# Patient Record
Sex: MALE | Race: WHITE | NOT HISPANIC OR LATINO | Employment: FULL TIME | URBAN - NONMETROPOLITAN AREA
[De-identification: names, ages, dates, MRNs, and addresses within clinical notes are randomized per-mention and may not be internally consistent; named-entity substitution may affect disease eponyms.]

---

## 2020-11-13 ENCOUNTER — HOSPITAL ENCOUNTER (EMERGENCY)
Facility: HOSPITAL | Age: 35
Discharge: HOME | End: 2020-11-13
Attending: EMERGENCY MEDICINE
Payer: MEDICAID

## 2020-11-13 VITALS
TEMPERATURE: 98 F | OXYGEN SATURATION: 98 % | SYSTOLIC BLOOD PRESSURE: 127 MMHG | HEIGHT: 70 IN | HEART RATE: 86 BPM | RESPIRATION RATE: 16 BRPM | DIASTOLIC BLOOD PRESSURE: 56 MMHG | BODY MASS INDEX: 23.19 KG/M2 | WEIGHT: 162 LBS

## 2020-11-13 DIAGNOSIS — L04.0 ACUTE CERVICAL ADENITIS: Primary | ICD-10-CM

## 2020-11-13 PROCEDURE — 99284 EMERGENCY DEPT VISIT MOD MDM: CPT | Mod: 25

## 2020-11-13 PROCEDURE — 63600175 PHARM REV CODE 636 W HCPCS: Performed by: EMERGENCY MEDICINE

## 2020-11-13 PROCEDURE — 25000003 PHARM REV CODE 250: Performed by: EMERGENCY MEDICINE

## 2020-11-13 PROCEDURE — 96372 THER/PROPH/DIAG INJ SC/IM: CPT

## 2020-11-13 RX ORDER — IBUPROFEN 600 MG/1
600 TABLET ORAL
Status: COMPLETED | OUTPATIENT
Start: 2020-11-13 | End: 2020-11-13

## 2020-11-13 RX ADMIN — IBUPROFEN 600 MG: 600 TABLET, FILM COATED ORAL at 09:11

## 2020-11-13 RX ADMIN — PENICILLIN G BENZATHINE AND PENICILLIN G PROCAINE 1.2 MILLION UNITS: 600000; 600000 INJECTION, SUSPENSION INTRAMUSCULAR at 09:11

## 2020-11-13 NOTE — ED PROVIDER NOTES
Encounter Date: 11/13/2020       History     Chief Complaint   Patient presents with    Sore Throat     pain to the between left jaw and neck since this morning.  palpable knot noted.  tender upon palpation.     35-year-old male presents with swollen lymph nodes to his neck.  Symptoms started this morning.  He states that is swollen and tender with the left side being worse than the right side.  Patient denies congestion, cough, sore throat, ear pain, rash, or dental pain.  Patient denies any other symptoms        Review of patient's allergies indicates:  No Known Allergies  History reviewed. No pertinent past medical history.  History reviewed. No pertinent surgical history.  History reviewed. No pertinent family history.  Social History     Tobacco Use    Smoking status: Smoker, Current Status Unknown     Packs/day: 1.00    Smokeless tobacco: Never Used   Substance Use Topics    Alcohol use: Not Currently    Drug use: Not Currently     Review of Systems   HENT:        Cervical adenopathy   All other systems reviewed and are negative.      Physical Exam     Initial Vitals [11/13/20 0848]   BP Pulse Resp Temp SpO2   (!) 127/58 86 16 98 °F (36.7 °C) 98 %      MAP       --         Physical Exam    Nursing note and vitals reviewed.  Constitutional: He appears well-developed and well-nourished.   HENT:   Mouth/Throat: Oropharynx is clear and moist.   Neck: Normal range of motion. Neck supple. No tracheal deviation present.   Mild cervical adenopathy left greater than right, mild tenderness to palpation of anterior cervical chain, no erythema   Cardiovascular: Normal rate and regular rhythm.   Pulmonary/Chest: Breath sounds normal. No stridor. No respiratory distress.   Lymphadenopathy:     He has cervical adenopathy.   Neurological: He is alert and oriented to person, place, and time.   Skin: Skin is warm and dry.         ED Course   Procedures  Labs Reviewed - No data to display       Imaging Results    None                                       Clinical Impression:     ICD-10-CM ICD-9-CM   1. Acute cervical adenitis  L04.0 683                                               Hollis Kessler MD  11/13/20 0904

## 2020-11-14 ENCOUNTER — HOSPITAL ENCOUNTER (EMERGENCY)
Facility: HOSPITAL | Age: 35
Discharge: HOME | End: 2020-11-14
Attending: EMERGENCY MEDICINE
Payer: MEDICAID

## 2020-11-14 VITALS
TEMPERATURE: 98 F | HEIGHT: 70 IN | WEIGHT: 161.38 LBS | SYSTOLIC BLOOD PRESSURE: 137 MMHG | BODY MASS INDEX: 23.1 KG/M2 | OXYGEN SATURATION: 98 % | HEART RATE: 106 BPM | DIASTOLIC BLOOD PRESSURE: 82 MMHG | RESPIRATION RATE: 16 BRPM

## 2020-11-14 DIAGNOSIS — R59.0 LYMPHADENOPATHY OF HEAD AND NECK REGION: ICD-10-CM

## 2020-11-14 DIAGNOSIS — J34.0 CELLULITIS OF NOSE, EXTERNAL: Primary | ICD-10-CM

## 2020-11-14 PROCEDURE — 99284 EMERGENCY DEPT VISIT MOD MDM: CPT

## 2020-11-14 PROCEDURE — 25000003 PHARM REV CODE 250: Performed by: EMERGENCY MEDICINE

## 2020-11-14 RX ORDER — SULFAMETHOXAZOLE AND TRIMETHOPRIM 800; 160 MG/1; MG/1
1 TABLET ORAL 2 TIMES DAILY
Qty: 20 TABLET | Refills: 0 | Status: SHIPPED | OUTPATIENT
Start: 2020-11-14 | End: 2020-11-24

## 2020-11-14 RX ORDER — TRAMADOL HYDROCHLORIDE 50 MG/1
50 TABLET ORAL EVERY 6 HOURS PRN
Qty: 8 TABLET | Refills: 0 | Status: SHIPPED | OUTPATIENT
Start: 2020-11-14

## 2020-11-14 RX ORDER — IBUPROFEN 600 MG/1
600 TABLET ORAL EVERY 6 HOURS PRN
Qty: 40 TABLET | Refills: 0 | Status: SHIPPED | OUTPATIENT
Start: 2020-11-14 | End: 2021-07-27 | Stop reason: SDUPTHER

## 2020-11-14 RX ORDER — IBUPROFEN 600 MG/1
600 TABLET ORAL
Status: COMPLETED | OUTPATIENT
Start: 2020-11-14 | End: 2020-11-14

## 2020-11-14 RX ORDER — SULFAMETHOXAZOLE AND TRIMETHOPRIM 800; 160 MG/1; MG/1
1 TABLET ORAL
Status: COMPLETED | OUTPATIENT
Start: 2020-11-14 | End: 2020-11-14

## 2020-11-14 RX ADMIN — SULFAMETHOXAZOLE AND TRIMETHOPRIM 1 TABLET: 800; 160 TABLET ORAL at 04:11

## 2020-11-14 RX ADMIN — IBUPROFEN 600 MG: 600 TABLET, FILM COATED ORAL at 04:11

## 2020-11-14 NOTE — ED PROVIDER NOTES
"Encounter Date: 11/14/2020       History     Chief Complaint   Patient presents with    Lymphadenopathy     Patient seen in ER for swelling to neck, was treated and sent home. Today, patient reports continued swelling and pain to lymph nodes to right side of neck and "bite" and redness to tip of nose.      35-year-old male presents with tenderness, redness, and swelling of the tip of his nose.  He also complains of swollen lymph nodes in his neck.  The patient was seen yesterday for cervical adenitis received a Bicillin shot. He states the swelling is nose got much worse since then.  He still denies sore throat or fever        Review of patient's allergies indicates:  No Known Allergies  No past medical history on file.  No past surgical history on file.  No family history on file.  Social History     Tobacco Use    Smoking status: Smoker, Current Status Unknown     Packs/day: 1.00    Smokeless tobacco: Never Used   Substance Use Topics    Alcohol use: Not Currently    Drug use: Not Currently     Review of Systems   HENT:        Adenopathy   Skin:        Nasal cellulitis   All other systems reviewed and are negative.      Physical Exam     Initial Vitals [11/14/20 1636]   BP Pulse Resp Temp SpO2   137/82 106 16 98.3 °F (36.8 °C) 98 %      MAP       --         Physical Exam    Nursing note and vitals reviewed.  Constitutional: He appears well-developed and well-nourished.   HENT:   Mouth/Throat: Oropharynx is clear and moist.   Neck: Neck supple.   Cardiovascular: Normal rate and regular rhythm.   Pulmonary/Chest: Breath sounds normal. No respiratory distress.   Lymphadenopathy:     He has cervical adenopathy.   Neurological: He is alert and oriented to person, place, and time.   Skin: Skin is warm and dry.   Moderate cellulitis to tip of nose, no drainage, no fluctuance noted         ED Course   Procedures  Labs Reviewed - No data to display       Imaging Results    None                                  "     Clinical Impression:     ICD-10-CM ICD-9-CM   1. Cellulitis of nose, external  J34.0 682.0   2. Lymphadenopathy of head and neck region  R59.0 785.6                          ED Disposition Condition    Discharge Stable        ED Prescriptions     Medication Sig Dispense Start Date End Date Auth. Provider    sulfamethoxazole-trimethoprim 800-160mg (BACTRIM DS) 800-160 mg Tab Take 1 tablet by mouth 2 (two) times daily. for 10 days 20 tablet 11/14/2020 11/24/2020 Hollis Kessler MD    ibuprofen (ADVIL,MOTRIN) 600 MG tablet Take 1 tablet (600 mg total) by mouth every 6 (six) hours as needed for Pain. 40 tablet 11/14/2020  Hollis Kessler MD    traMADoL (ULTRAM) 50 mg tablet Take 1 tablet (50 mg total) by mouth every 6 (six) hours as needed for Pain. 8 tablet 11/14/2020  Hollis Kessler MD        Follow-up Information     Follow up With Specialties Details Why Contact Info    Primary care physician  In 3 days As needed, If symptoms worsen                                        Hollis Kessler MD  11/14/20 7679

## 2021-07-21 ENCOUNTER — HOSPITAL ENCOUNTER (EMERGENCY)
Facility: HOSPITAL | Age: 36
Discharge: HOME | End: 2021-07-21
Attending: EMERGENCY MEDICINE
Payer: MEDICAID

## 2021-07-21 VITALS
SYSTOLIC BLOOD PRESSURE: 141 MMHG | HEART RATE: 118 BPM | DIASTOLIC BLOOD PRESSURE: 57 MMHG | OXYGEN SATURATION: 96 % | RESPIRATION RATE: 17 BRPM | TEMPERATURE: 99 F

## 2021-07-21 DIAGNOSIS — Z20.822 LAB TEST NEGATIVE FOR COVID-19 VIRUS: Primary | ICD-10-CM

## 2021-07-21 LAB
CTP QC/QA: YES
SARS-COV-2 RDRP RESP QL NAA+PROBE: NEGATIVE

## 2021-07-21 PROCEDURE — 99282 EMERGENCY DEPT VISIT SF MDM: CPT

## 2021-07-21 PROCEDURE — U0002 COVID-19 LAB TEST NON-CDC: HCPCS | Performed by: EMERGENCY MEDICINE

## 2021-07-23 ENCOUNTER — HOSPITAL ENCOUNTER (EMERGENCY)
Facility: HOSPITAL | Age: 36
Discharge: HOME | End: 2021-07-23
Attending: EMERGENCY MEDICINE
Payer: MEDICAID

## 2021-07-23 VITALS
HEART RATE: 99 BPM | HEIGHT: 70 IN | WEIGHT: 150.19 LBS | SYSTOLIC BLOOD PRESSURE: 113 MMHG | TEMPERATURE: 99 F | RESPIRATION RATE: 18 BRPM | BODY MASS INDEX: 21.5 KG/M2 | OXYGEN SATURATION: 95 % | DIASTOLIC BLOOD PRESSURE: 66 MMHG

## 2021-07-23 DIAGNOSIS — J06.9 VIRAL URI WITH COUGH: Primary | ICD-10-CM

## 2021-07-23 LAB
CTP QC/QA: YES
SARS-COV-2 RDRP RESP QL NAA+PROBE: NEGATIVE

## 2021-07-23 PROCEDURE — 63600175 PHARM REV CODE 636 W HCPCS: Performed by: CLINICAL NURSE SPECIALIST

## 2021-07-23 PROCEDURE — 99284 EMERGENCY DEPT VISIT MOD MDM: CPT

## 2021-07-23 PROCEDURE — 96372 THER/PROPH/DIAG INJ SC/IM: CPT

## 2021-07-23 PROCEDURE — U0002 COVID-19 LAB TEST NON-CDC: HCPCS | Performed by: CLINICAL NURSE SPECIALIST

## 2021-07-23 RX ORDER — DEXAMETHASONE SODIUM PHOSPHATE 4 MG/ML
4 INJECTION, SOLUTION INTRA-ARTICULAR; INTRALESIONAL; INTRAMUSCULAR; INTRAVENOUS; SOFT TISSUE
Status: COMPLETED | OUTPATIENT
Start: 2021-07-23 | End: 2021-07-23

## 2021-07-23 RX ORDER — BENZONATATE 100 MG/1
100 CAPSULE ORAL 3 TIMES DAILY PRN
Qty: 20 CAPSULE | Refills: 0 | Status: SHIPPED | OUTPATIENT
Start: 2021-07-23 | End: 2021-07-27 | Stop reason: SDUPTHER

## 2021-07-23 RX ADMIN — DEXAMETHASONE SODIUM PHOSPHATE 4 MG: 4 INJECTION, SOLUTION INTRA-ARTICULAR; INTRALESIONAL; INTRAMUSCULAR; INTRAVENOUS; SOFT TISSUE at 11:07

## 2021-07-27 ENCOUNTER — HOSPITAL ENCOUNTER (EMERGENCY)
Facility: HOSPITAL | Age: 36
Discharge: HOME | End: 2021-07-27
Attending: EMERGENCY MEDICINE
Payer: MEDICAID

## 2021-07-27 VITALS
TEMPERATURE: 99 F | DIASTOLIC BLOOD PRESSURE: 64 MMHG | BODY MASS INDEX: 21.62 KG/M2 | SYSTOLIC BLOOD PRESSURE: 99 MMHG | HEIGHT: 70 IN | HEART RATE: 113 BPM | RESPIRATION RATE: 16 BRPM | OXYGEN SATURATION: 95 % | WEIGHT: 151 LBS

## 2021-07-27 DIAGNOSIS — J06.9 UPPER RESPIRATORY TRACT INFECTION, UNSPECIFIED TYPE: Primary | ICD-10-CM

## 2021-07-27 LAB
CTP QC/QA: YES
SARS-COV-2 RDRP RESP QL NAA+PROBE: NEGATIVE

## 2021-07-27 PROCEDURE — 63600175 PHARM REV CODE 636 W HCPCS: Performed by: CLINICAL NURSE SPECIALIST

## 2021-07-27 PROCEDURE — U0002 COVID-19 LAB TEST NON-CDC: HCPCS | Performed by: CLINICAL NURSE SPECIALIST

## 2021-07-27 PROCEDURE — 96372 THER/PROPH/DIAG INJ SC/IM: CPT

## 2021-07-27 PROCEDURE — 99284 EMERGENCY DEPT VISIT MOD MDM: CPT | Mod: 25

## 2021-07-27 PROCEDURE — 0001A HC IMMUNIZ ADMIN, SARS-COV-2 COVID-19 VACC, 30MCG/0.3ML, 1ST DOSE: CPT | Performed by: CLINICAL NURSE SPECIALIST

## 2021-07-27 PROCEDURE — 91300 PHARM REV CODE 636 W HCPCS: CPT | Performed by: CLINICAL NURSE SPECIALIST

## 2021-07-27 RX ORDER — BENZONATATE 100 MG/1
100 CAPSULE ORAL 3 TIMES DAILY PRN
Qty: 20 CAPSULE | Refills: 0 | Status: SHIPPED | OUTPATIENT
Start: 2021-07-27 | End: 2021-08-06

## 2021-07-27 RX ORDER — IBUPROFEN 600 MG/1
600 TABLET ORAL EVERY 6 HOURS PRN
Qty: 40 TABLET | Refills: 0 | Status: SHIPPED | OUTPATIENT
Start: 2021-07-27

## 2021-07-27 RX ORDER — DEXAMETHASONE SODIUM PHOSPHATE 4 MG/ML
4 INJECTION, SOLUTION INTRA-ARTICULAR; INTRALESIONAL; INTRAMUSCULAR; INTRAVENOUS; SOFT TISSUE
Status: COMPLETED | OUTPATIENT
Start: 2021-07-27 | End: 2021-07-27

## 2021-07-27 RX ADMIN — RNA INGREDIENT BNT-162B2 0.3 ML: 0.23 INJECTION, SUSPENSION INTRAMUSCULAR at 04:07

## 2021-07-27 RX ADMIN — DEXAMETHASONE SODIUM PHOSPHATE 4 MG: 4 INJECTION, SOLUTION INTRA-ARTICULAR; INTRALESIONAL; INTRAMUSCULAR; INTRAVENOUS; SOFT TISSUE at 04:07

## 2021-08-02 ENCOUNTER — HOSPITAL ENCOUNTER (EMERGENCY)
Facility: HOSPITAL | Age: 36
Discharge: HOME | End: 2021-08-02
Attending: EMERGENCY MEDICINE
Payer: MEDICAID

## 2021-08-02 VITALS
HEART RATE: 88 BPM | TEMPERATURE: 98 F | WEIGHT: 147.69 LBS | SYSTOLIC BLOOD PRESSURE: 135 MMHG | BODY MASS INDEX: 21.19 KG/M2 | DIASTOLIC BLOOD PRESSURE: 82 MMHG | RESPIRATION RATE: 20 BRPM | OXYGEN SATURATION: 98 %

## 2021-08-02 DIAGNOSIS — J18.9 PNEUMONIA OF LEFT LOWER LOBE DUE TO INFECTIOUS ORGANISM: Primary | ICD-10-CM

## 2021-08-02 DIAGNOSIS — R05.9 COUGH: ICD-10-CM

## 2021-08-02 PROCEDURE — 96372 THER/PROPH/DIAG INJ SC/IM: CPT

## 2021-08-02 PROCEDURE — 63600175 PHARM REV CODE 636 W HCPCS: Performed by: EMERGENCY MEDICINE

## 2021-08-02 PROCEDURE — 25000003 PHARM REV CODE 250: Performed by: EMERGENCY MEDICINE

## 2021-08-02 PROCEDURE — 99284 EMERGENCY DEPT VISIT MOD MDM: CPT | Mod: 25

## 2021-08-02 PROCEDURE — 25000242 PHARM REV CODE 250 ALT 637 W/ HCPCS: Performed by: EMERGENCY MEDICINE

## 2021-08-02 RX ORDER — LEVOFLOXACIN 500 MG/1
500 TABLET, FILM COATED ORAL ONCE
Status: COMPLETED | OUTPATIENT
Start: 2021-08-02 | End: 2021-08-02

## 2021-08-02 RX ORDER — IPRATROPIUM BROMIDE AND ALBUTEROL SULFATE 2.5; .5 MG/3ML; MG/3ML
3 SOLUTION RESPIRATORY (INHALATION)
Status: COMPLETED | OUTPATIENT
Start: 2021-08-02 | End: 2021-08-02

## 2021-08-02 RX ORDER — DEXAMETHASONE SODIUM PHOSPHATE 4 MG/ML
8 INJECTION, SOLUTION INTRA-ARTICULAR; INTRALESIONAL; INTRAMUSCULAR; INTRAVENOUS; SOFT TISSUE
Status: COMPLETED | OUTPATIENT
Start: 2021-08-02 | End: 2021-08-02

## 2021-08-02 RX ORDER — LEVOFLOXACIN 500 MG/1
500 TABLET, FILM COATED ORAL DAILY
Status: DISCONTINUED | OUTPATIENT
Start: 2021-08-02 | End: 2021-08-02

## 2021-08-02 RX ORDER — CEFTRIAXONE 1 G/1
1 INJECTION, POWDER, FOR SOLUTION INTRAMUSCULAR; INTRAVENOUS
Status: COMPLETED | OUTPATIENT
Start: 2021-08-02 | End: 2021-08-02

## 2021-08-02 RX ORDER — ALBUTEROL SULFATE 90 UG/1
2 AEROSOL, METERED RESPIRATORY (INHALATION) EVERY 6 HOURS PRN
Qty: 18 G | Refills: 0 | Status: SHIPPED | OUTPATIENT
Start: 2021-08-02 | End: 2021-08-25 | Stop reason: SDUPTHER

## 2021-08-02 RX ORDER — PROMETHAZINE HYDROCHLORIDE AND DEXTROMETHORPHAN HYDROBROMIDE 6.25; 15 MG/5ML; MG/5ML
5 SYRUP ORAL EVERY 4 HOURS PRN
Qty: 120 ML | Refills: 0 | Status: SHIPPED | OUTPATIENT
Start: 2021-08-02 | End: 2021-08-12

## 2021-08-02 RX ORDER — LEVOFLOXACIN 500 MG/1
500 TABLET, FILM COATED ORAL DAILY
Qty: 10 TABLET | Refills: 0 | Status: SHIPPED | OUTPATIENT
Start: 2021-08-02 | End: 2021-08-12

## 2021-08-02 RX ORDER — DEXAMETHASONE 4 MG/1
8 TABLET ORAL DAILY
Qty: 14 TABLET | Refills: 0 | Status: SHIPPED | OUTPATIENT
Start: 2021-08-02 | End: 2021-08-09

## 2021-08-02 RX ADMIN — IPRATROPIUM BROMIDE AND ALBUTEROL SULFATE 3 ML: 2.5; .5 SOLUTION RESPIRATORY (INHALATION) at 06:08

## 2021-08-02 RX ADMIN — LEVOFLOXACIN 500 MG: 500 TABLET, FILM COATED ORAL at 06:08

## 2021-08-02 RX ADMIN — CEFTRIAXONE SODIUM 1 G: 1 INJECTION, POWDER, FOR SOLUTION INTRAMUSCULAR; INTRAVENOUS at 06:08

## 2021-08-02 RX ADMIN — DEXAMETHASONE SODIUM PHOSPHATE 8 MG: 4 INJECTION, SOLUTION INTRA-ARTICULAR; INTRALESIONAL; INTRAMUSCULAR; INTRAVENOUS; SOFT TISSUE at 06:08

## 2021-08-25 ENCOUNTER — HOSPITAL ENCOUNTER (EMERGENCY)
Facility: HOSPITAL | Age: 36
Discharge: HOME | End: 2021-08-25
Attending: EMERGENCY MEDICINE
Payer: MEDICAID

## 2021-08-25 VITALS
BODY MASS INDEX: 21.52 KG/M2 | DIASTOLIC BLOOD PRESSURE: 61 MMHG | SYSTOLIC BLOOD PRESSURE: 118 MMHG | WEIGHT: 150 LBS | TEMPERATURE: 98 F | HEART RATE: 106 BPM | OXYGEN SATURATION: 96 % | RESPIRATION RATE: 16 BRPM

## 2021-08-25 DIAGNOSIS — U07.1 COVID-19: Primary | ICD-10-CM

## 2021-08-25 LAB
CTP QC/QA: YES
SARS-COV-2 RDRP RESP QL NAA+PROBE: POSITIVE

## 2021-08-25 PROCEDURE — U0002 COVID-19 LAB TEST NON-CDC: HCPCS | Performed by: CLINICAL NURSE SPECIALIST

## 2021-08-25 PROCEDURE — 99284 EMERGENCY DEPT VISIT MOD MDM: CPT | Mod: 25

## 2021-08-25 RX ORDER — BENZONATATE 100 MG/1
100 CAPSULE ORAL 3 TIMES DAILY PRN
Qty: 20 CAPSULE | Refills: 0 | Status: SHIPPED | OUTPATIENT
Start: 2021-08-25 | End: 2021-09-04

## 2021-08-25 RX ORDER — ONDANSETRON 4 MG/1
4 TABLET, FILM COATED ORAL EVERY 6 HOURS
Qty: 12 TABLET | Refills: 0 | Status: SHIPPED | OUTPATIENT
Start: 2021-08-25

## 2021-08-25 RX ORDER — ALBUTEROL SULFATE 90 UG/1
2 AEROSOL, METERED RESPIRATORY (INHALATION) EVERY 6 HOURS PRN
Qty: 18 G | Refills: 0 | Status: ON HOLD | OUTPATIENT
Start: 2021-08-25 | End: 2022-11-12 | Stop reason: HOSPADM

## 2021-08-25 RX ORDER — AZITHROMYCIN 250 MG/1
250 TABLET, FILM COATED ORAL DAILY
Qty: 6 TABLET | Refills: 0 | Status: ON HOLD | OUTPATIENT
Start: 2021-08-25 | End: 2022-11-12 | Stop reason: HOSPADM

## 2022-11-06 ENCOUNTER — HOSPITAL ENCOUNTER (EMERGENCY)
Facility: HOSPITAL | Age: 37
Discharge: HOME | End: 2022-11-06
Attending: STUDENT IN AN ORGANIZED HEALTH CARE EDUCATION/TRAINING PROGRAM
Payer: MEDICAID

## 2022-11-06 VITALS
TEMPERATURE: 98 F | OXYGEN SATURATION: 100 % | SYSTOLIC BLOOD PRESSURE: 115 MMHG | RESPIRATION RATE: 16 BRPM | BODY MASS INDEX: 21.95 KG/M2 | HEART RATE: 124 BPM | DIASTOLIC BLOOD PRESSURE: 50 MMHG | WEIGHT: 153 LBS

## 2022-11-06 DIAGNOSIS — J10.1 INFLUENZA A: Primary | ICD-10-CM

## 2022-11-06 LAB
CTP QC/QA: YES
POC MOLECULAR INFLUENZA A AGN: POSITIVE
POC MOLECULAR INFLUENZA B AGN: NEGATIVE

## 2022-11-06 PROCEDURE — 99284 EMERGENCY DEPT VISIT MOD MDM: CPT

## 2022-11-06 PROCEDURE — 87502 INFLUENZA DNA AMP PROBE: CPT

## 2022-11-06 RX ORDER — OSELTAMIVIR PHOSPHATE 75 MG/1
75 CAPSULE ORAL 2 TIMES DAILY
Qty: 10 CAPSULE | Refills: 0 | Status: ON HOLD | OUTPATIENT
Start: 2022-11-06 | End: 2022-11-12 | Stop reason: HOSPADM

## 2022-11-06 RX ORDER — PROMETHAZINE HYDROCHLORIDE AND DEXTROMETHORPHAN HYDROBROMIDE 6.25; 15 MG/5ML; MG/5ML
5 SYRUP ORAL EVERY 4 HOURS PRN
Qty: 118 ML | Refills: 0 | Status: SHIPPED | OUTPATIENT
Start: 2022-11-06 | End: 2022-11-16

## 2022-11-06 RX ORDER — FLUTICASONE PROPIONATE 50 MCG
1 SPRAY, SUSPENSION (ML) NASAL 2 TIMES DAILY PRN
Qty: 15 G | Refills: 0 | Status: SHIPPED | OUTPATIENT
Start: 2022-11-06

## 2022-11-06 NOTE — Clinical Note
"Kj"Yulia Denney was seen and treated in our emergency department on 11/6/2022.  He may return to work on 11/09/2022.       If you have any questions or concerns, please don't hesitate to call.      Nia Venegas NP"

## 2022-11-07 NOTE — ED PROVIDER NOTES
Encounter Date: 11/6/2022       History     Chief Complaint   Patient presents with    Generalized Body Aches     Pt stated that he began experiencing body aches / cough / fever since earlier today. Spouse also ill.      This is a 37-year-old white male with noncontributory past medical history, smoker, who presents to the emergency department with concerns regarding URI signs and symptoms after known exposure to significant other with similar symptoms.  Today patient developed stuffy/runny nose, sore throat, cough, body aches, fatigue, and headache.  He denies vomiting or diarrhea.    Review of patient's allergies indicates:  No Known Allergies  History reviewed. No pertinent past medical history.  No past surgical history on file.  No family history on file.  Social History     Tobacco Use    Smoking status: Smoker, Current Status Unknown     Packs/day: 1.00     Types: Cigarettes    Smokeless tobacco: Never   Substance Use Topics    Alcohol use: Not Currently    Drug use: Not Currently     Review of Systems   Constitutional:  Positive for fatigue and fever.   HENT:  Positive for congestion, rhinorrhea and sore throat.    Respiratory:  Positive for cough.    Gastrointestinal: Negative.    Musculoskeletal:  Positive for myalgias.   Neurological:  Positive for headaches.     Physical Exam     Initial Vitals   BP Pulse Resp Temp SpO2   11/06/22 2029 11/06/22 2027 11/06/22 2027 11/06/22 2027 11/06/22 2027   (!) 115/50 (!) 124 16 98.4 °F (36.9 °C) 100 %      MAP       --                Physical Exam    Nursing note and vitals reviewed.  Constitutional: He appears well-developed and well-nourished. He is active. No distress.   HENT:   Head: Normocephalic and atraumatic.   Mouth/Throat: No oropharyngeal exudate.   Erythema noted to posterior pharynx   Eyes: EOM are normal. Pupils are equal, round, and reactive to light.   Neck: Neck supple.   Normal range of motion.  Cardiovascular:  Normal rate, regular rhythm and normal  heart sounds.           Pulmonary/Chest: Breath sounds normal. No respiratory distress.   Musculoskeletal:         General: Normal range of motion.      Cervical back: Normal range of motion and neck supple.     Neurological: He is alert and oriented to person, place, and time. GCS score is 15. GCS eye subscore is 4. GCS verbal subscore is 5. GCS motor subscore is 6.   Skin: Skin is warm and dry. Capillary refill takes less than 2 seconds.   Psychiatric: He has a normal mood and affect. His behavior is normal. Thought content normal.       ED Course   Procedures  Labs Reviewed   POCT INFLUENZA A/B MOLECULAR - Abnormal; Notable for the following components:       Result Value    POC Molecular Influenza A Ag Positive (*)     All other components within normal limits          Imaging Results    None          Medications - No data to display              ED Course as of 11/06/22 2047   Sun Nov 06, 2022 2046 Influenza A positive, B negative [CB]      ED Course User Index  [CB] Nia Venegas NP                 Clinical Impression:   Final diagnoses:  [J10.1] Influenza A (Primary)      ED Disposition Condition    Discharge Stable          ED Prescriptions       Medication Sig Dispense Start Date End Date Auth. Provider    promethazine-dextromethorphan (PROMETHAZINE-DM) 6.25-15 mg/5 mL Syrp Take 5 mLs by mouth every 4 (four) hours as needed. 118 mL 11/6/2022 11/16/2022 Nia Venegas NP    fluticasone propionate (FLONASE) 50 mcg/actuation nasal spray 1 spray (50 mcg total) by Each Nostril route 2 (two) times daily as needed. 15 g 11/6/2022 -- Nia Venegas NP    oseltamivir (TAMIFLU) 75 MG capsule Take 1 capsule (75 mg total) by mouth 2 (two) times daily. for 5 days 10 capsule 11/6/2022 11/11/2022 iNa Venegas NP          Follow-up Information       Follow up With Specialties Details Why Contact Info    PCP Follow UP  Schedule an appointment as soon as possible for a visit in 2 days for follow-up, for  re-evaluation of today's complaint              Nia Venegas, ARIANNA  11/06/22 2048

## 2022-11-10 ENCOUNTER — HOSPITAL ENCOUNTER (INPATIENT)
Facility: HOSPITAL | Age: 37
LOS: 2 days | Discharge: LEFT BEFORE DISCHARGE | DRG: 871 | End: 2022-11-12
Attending: STUDENT IN AN ORGANIZED HEALTH CARE EDUCATION/TRAINING PROGRAM | Admitting: EMERGENCY MEDICINE
Payer: MEDICAID

## 2022-11-10 DIAGNOSIS — J10.1 INFLUENZA A: ICD-10-CM

## 2022-11-10 DIAGNOSIS — R06.89 RESPIRATORY INSUFFICIENCY: ICD-10-CM

## 2022-11-10 DIAGNOSIS — R09.02 HYPOXIC: ICD-10-CM

## 2022-11-10 DIAGNOSIS — J18.9 PNEUMONIA DUE TO INFECTIOUS ORGANISM, UNSPECIFIED LATERALITY, UNSPECIFIED PART OF LUNG: Primary | ICD-10-CM

## 2022-11-10 DIAGNOSIS — A41.9 SEPSIS, DUE TO UNSPECIFIED ORGANISM, UNSPECIFIED WHETHER ACUTE ORGAN DYSFUNCTION PRESENT: ICD-10-CM

## 2022-11-10 DIAGNOSIS — A41.9 SEPSIS: ICD-10-CM

## 2022-11-10 LAB
ALBUMIN SERPL BCP-MCNC: 2.8 G/DL (ref 3.5–5.2)
ALP SERPL-CCNC: 70 U/L (ref 55–135)
ALT SERPL W/O P-5'-P-CCNC: 27 U/L (ref 10–44)
ANION GAP SERPL CALC-SCNC: 9 MMOL/L (ref 8–16)
APTT BLDCRRT: 29.8 SEC (ref 21–32)
AST SERPL-CCNC: 44 U/L (ref 10–40)
BASOPHILS # BLD AUTO: ABNORMAL K/UL (ref 0–0.2)
BASOPHILS NFR BLD: 0 % (ref 0–1.9)
BILIRUB SERPL-MCNC: 0.8 MG/DL (ref 0.1–1)
BUN SERPL-MCNC: 25 MG/DL (ref 6–20)
CALCIUM SERPL-MCNC: 8.5 MG/DL (ref 8.7–10.5)
CHLORIDE SERPL-SCNC: 96 MMOL/L (ref 95–110)
CO2 SERPL-SCNC: 26 MMOL/L (ref 23–29)
CREAT SERPL-MCNC: 0.9 MG/DL (ref 0.5–1.4)
CTP QC/QA: YES
DIFFERENTIAL METHOD: ABNORMAL
EOSINOPHIL # BLD AUTO: ABNORMAL K/UL (ref 0–0.5)
EOSINOPHIL NFR BLD: 0 % (ref 0–8)
ERYTHROCYTE [DISTWIDTH] IN BLOOD BY AUTOMATED COUNT: 12.2 % (ref 11.5–14.5)
EST. GFR  (NO RACE VARIABLE): >60 ML/MIN/1.73 M^2
GLUCOSE SERPL-MCNC: 159 MG/DL (ref 70–110)
HCT VFR BLD AUTO: 41.7 % (ref 40–54)
HGB BLD-MCNC: 14.8 G/DL (ref 14–18)
IMM GRANULOCYTES # BLD AUTO: ABNORMAL K/UL (ref 0–0.04)
IMM GRANULOCYTES NFR BLD AUTO: ABNORMAL % (ref 0–0.5)
INR PPP: 0.9 (ref 0.8–1.2)
LACTATE SERPL-SCNC: 2.4 MMOL/L (ref 0.5–2.2)
LACTATE SERPL-SCNC: 2.5 MMOL/L (ref 0.5–2.2)
LYMPHOCYTES # BLD AUTO: ABNORMAL K/UL (ref 1–4.8)
LYMPHOCYTES NFR BLD: 9 % (ref 18–48)
MCH RBC QN AUTO: 29.5 PG (ref 27–31)
MCHC RBC AUTO-ENTMCNC: 35.5 G/DL (ref 32–36)
MCV RBC AUTO: 83 FL (ref 82–98)
METAMYELOCYTES NFR BLD MANUAL: 2 %
MONOCYTES # BLD AUTO: ABNORMAL K/UL (ref 0.3–1)
MONOCYTES NFR BLD: 2 % (ref 4–15)
MYELOCYTES NFR BLD MANUAL: 2 %
NEUTROPHILS NFR BLD: 53 % (ref 38–73)
NEUTS BAND NFR BLD MANUAL: 32 %
NRBC BLD-RTO: 0 /100 WBC
NT-PROBNP SERPL-MCNC: 75 PG/ML (ref 5–450)
PLATELET # BLD AUTO: 177 K/UL (ref 150–450)
PMV BLD AUTO: 9.8 FL (ref 9.2–12.9)
POTASSIUM SERPL-SCNC: 3.6 MMOL/L (ref 3.5–5.1)
PROT SERPL-MCNC: 7.2 G/DL (ref 6–8.4)
PROTHROMBIN TIME: 10.2 SEC (ref 9–12.5)
RBC # BLD AUTO: 5.01 M/UL (ref 4.6–6.2)
SARS-COV-2 RDRP RESP QL NAA+PROBE: NEGATIVE
SODIUM SERPL-SCNC: 131 MMOL/L (ref 136–145)
TROPONIN I SERPL DL<=0.01 NG/ML-MCNC: 5.4 PG/ML (ref 0–60)
WBC # BLD AUTO: 6.19 K/UL (ref 3.9–12.7)

## 2022-11-10 PROCEDURE — 83605 ASSAY OF LACTIC ACID: CPT | Mod: 91 | Performed by: NURSE PRACTITIONER

## 2022-11-10 PROCEDURE — 87040 BLOOD CULTURE FOR BACTERIA: CPT | Mod: 59 | Performed by: NURSE PRACTITIONER

## 2022-11-10 PROCEDURE — 96365 THER/PROPH/DIAG IV INF INIT: CPT

## 2022-11-10 PROCEDURE — 94640 AIRWAY INHALATION TREATMENT: CPT

## 2022-11-10 PROCEDURE — 11000001 HC ACUTE MED/SURG PRIVATE ROOM

## 2022-11-10 PROCEDURE — 63600175 PHARM REV CODE 636 W HCPCS: Performed by: INTERNAL MEDICINE

## 2022-11-10 PROCEDURE — 99900031 HC PATIENT EDUCATION (STAT)

## 2022-11-10 PROCEDURE — 25000003 PHARM REV CODE 250: Performed by: INTERNAL MEDICINE

## 2022-11-10 PROCEDURE — 85730 THROMBOPLASTIN TIME PARTIAL: CPT | Performed by: STUDENT IN AN ORGANIZED HEALTH CARE EDUCATION/TRAINING PROGRAM

## 2022-11-10 PROCEDURE — 63600175 PHARM REV CODE 636 W HCPCS: Performed by: STUDENT IN AN ORGANIZED HEALTH CARE EDUCATION/TRAINING PROGRAM

## 2022-11-10 PROCEDURE — 85007 BL SMEAR W/DIFF WBC COUNT: CPT | Performed by: NURSE PRACTITIONER

## 2022-11-10 PROCEDURE — 99291 CRITICAL CARE FIRST HOUR: CPT | Mod: 25

## 2022-11-10 PROCEDURE — 80053 COMPREHEN METABOLIC PANEL: CPT | Performed by: NURSE PRACTITIONER

## 2022-11-10 PROCEDURE — 99900035 HC TECH TIME PER 15 MIN (STAT)

## 2022-11-10 PROCEDURE — 25000003 PHARM REV CODE 250: Performed by: NURSE PRACTITIONER

## 2022-11-10 PROCEDURE — 84484 ASSAY OF TROPONIN QUANT: CPT | Performed by: NURSE PRACTITIONER

## 2022-11-10 PROCEDURE — 96375 TX/PRO/DX INJ NEW DRUG ADDON: CPT

## 2022-11-10 PROCEDURE — 93010 ELECTROCARDIOGRAM REPORT: CPT | Mod: ,,, | Performed by: INTERNAL MEDICINE

## 2022-11-10 PROCEDURE — 93010 EKG 12-LEAD: ICD-10-PCS | Mod: ,,, | Performed by: INTERNAL MEDICINE

## 2022-11-10 PROCEDURE — 93005 ELECTROCARDIOGRAM TRACING: CPT

## 2022-11-10 PROCEDURE — 27000221 HC OXYGEN, UP TO 24 HOURS

## 2022-11-10 PROCEDURE — 83880 ASSAY OF NATRIURETIC PEPTIDE: CPT | Performed by: NURSE PRACTITIONER

## 2022-11-10 PROCEDURE — 96361 HYDRATE IV INFUSION ADD-ON: CPT

## 2022-11-10 PROCEDURE — 85027 COMPLETE CBC AUTOMATED: CPT | Performed by: NURSE PRACTITIONER

## 2022-11-10 PROCEDURE — 85610 PROTHROMBIN TIME: CPT | Performed by: STUDENT IN AN ORGANIZED HEALTH CARE EDUCATION/TRAINING PROGRAM

## 2022-11-10 PROCEDURE — 87635 SARS-COV-2 COVID-19 AMP PRB: CPT | Performed by: NURSE PRACTITIONER

## 2022-11-10 PROCEDURE — 36415 COLL VENOUS BLD VENIPUNCTURE: CPT | Performed by: NURSE PRACTITIONER

## 2022-11-10 PROCEDURE — 25000242 PHARM REV CODE 250 ALT 637 W/ HCPCS: Performed by: NURSE PRACTITIONER

## 2022-11-10 PROCEDURE — 27000207 HC ISOLATION

## 2022-11-10 PROCEDURE — 94761 N-INVAS EAR/PLS OXIMETRY MLT: CPT

## 2022-11-10 PROCEDURE — 83605 ASSAY OF LACTIC ACID: CPT | Performed by: NURSE PRACTITIONER

## 2022-11-10 PROCEDURE — 25000003 PHARM REV CODE 250: Performed by: STUDENT IN AN ORGANIZED HEALTH CARE EDUCATION/TRAINING PROGRAM

## 2022-11-10 PROCEDURE — 63600175 PHARM REV CODE 636 W HCPCS: Performed by: NURSE PRACTITIONER

## 2022-11-10 PROCEDURE — 25000242 PHARM REV CODE 250 ALT 637 W/ HCPCS: Performed by: INTERNAL MEDICINE

## 2022-11-10 RX ORDER — ACETAMINOPHEN 500 MG
1000 TABLET ORAL
Status: COMPLETED | OUTPATIENT
Start: 2022-11-10 | End: 2022-11-10

## 2022-11-10 RX ORDER — IPRATROPIUM BROMIDE AND ALBUTEROL SULFATE 2.5; .5 MG/3ML; MG/3ML
3 SOLUTION RESPIRATORY (INHALATION)
Status: DISCONTINUED | OUTPATIENT
Start: 2022-11-10 | End: 2022-11-12 | Stop reason: HOSPADM

## 2022-11-10 RX ORDER — ENOXAPARIN SODIUM 100 MG/ML
40 INJECTION SUBCUTANEOUS EVERY 24 HOURS
Status: DISCONTINUED | OUTPATIENT
Start: 2022-11-10 | End: 2022-11-12 | Stop reason: HOSPADM

## 2022-11-10 RX ORDER — ACETAMINOPHEN 325 MG/1
650 TABLET ORAL EVERY 8 HOURS PRN
Status: DISCONTINUED | OUTPATIENT
Start: 2022-11-10 | End: 2022-11-12 | Stop reason: HOSPADM

## 2022-11-10 RX ORDER — SODIUM CHLORIDE 9 MG/ML
INJECTION, SOLUTION INTRAVENOUS CONTINUOUS
Status: DISCONTINUED | OUTPATIENT
Start: 2022-11-10 | End: 2022-11-12 | Stop reason: HOSPADM

## 2022-11-10 RX ORDER — VANCOMYCIN HCL IN 5 % DEXTROSE 1G/250ML
1000 PLASTIC BAG, INJECTION (ML) INTRAVENOUS
Status: DISCONTINUED | OUTPATIENT
Start: 2022-11-11 | End: 2022-11-12

## 2022-11-10 RX ORDER — ONDANSETRON 2 MG/ML
4 INJECTION INTRAMUSCULAR; INTRAVENOUS
Status: COMPLETED | OUTPATIENT
Start: 2022-11-10 | End: 2022-11-10

## 2022-11-10 RX ORDER — SODIUM CHLORIDE 0.9 % (FLUSH) 0.9 %
10 SYRINGE (ML) INJECTION
Status: DISCONTINUED | OUTPATIENT
Start: 2022-11-10 | End: 2022-11-12 | Stop reason: HOSPADM

## 2022-11-10 RX ORDER — VANCOMYCIN 1.75 GRAM/500 ML IN 0.9 % SODIUM CHLORIDE INTRAVENOUS
1750 ONCE
Status: COMPLETED | OUTPATIENT
Start: 2022-11-10 | End: 2022-11-10

## 2022-11-10 RX ORDER — ONDANSETRON 2 MG/ML
4 INJECTION INTRAMUSCULAR; INTRAVENOUS EVERY 8 HOURS PRN
Status: DISCONTINUED | OUTPATIENT
Start: 2022-11-10 | End: 2022-11-12 | Stop reason: HOSPADM

## 2022-11-10 RX ORDER — OSELTAMIVIR PHOSPHATE 75 MG/1
75 CAPSULE ORAL
Status: COMPLETED | OUTPATIENT
Start: 2022-11-10 | End: 2022-11-10

## 2022-11-10 RX ORDER — IPRATROPIUM BROMIDE AND ALBUTEROL SULFATE 2.5; .5 MG/3ML; MG/3ML
3 SOLUTION RESPIRATORY (INHALATION)
Status: COMPLETED | OUTPATIENT
Start: 2022-11-10 | End: 2022-11-10

## 2022-11-10 RX ORDER — TALC
6 POWDER (GRAM) TOPICAL NIGHTLY PRN
Status: DISCONTINUED | OUTPATIENT
Start: 2022-11-10 | End: 2022-11-12 | Stop reason: HOSPADM

## 2022-11-10 RX ADMIN — ONDANSETRON HYDROCHLORIDE 4 MG: 2 SOLUTION INTRAMUSCULAR; INTRAVENOUS at 12:11

## 2022-11-10 RX ADMIN — ACETAMINOPHEN 1000 MG: 500 TABLET ORAL at 12:11

## 2022-11-10 RX ADMIN — ENOXAPARIN SODIUM 40 MG: 40 INJECTION SUBCUTANEOUS at 04:11

## 2022-11-10 RX ADMIN — OSELTAMIVIR PHOSPHATE 75 MG: 75 CAPSULE ORAL at 12:11

## 2022-11-10 RX ADMIN — SODIUM CHLORIDE, SODIUM LACTATE, POTASSIUM CHLORIDE, AND CALCIUM CHLORIDE 2067 ML: .6; .31; .03; .02 INJECTION, SOLUTION INTRAVENOUS at 12:11

## 2022-11-10 RX ADMIN — ACETAMINOPHEN 650 MG: 325 TABLET ORAL at 10:11

## 2022-11-10 RX ADMIN — CEFTRIAXONE 1 G: 1 INJECTION, SOLUTION INTRAVENOUS at 12:11

## 2022-11-10 RX ADMIN — IPRATROPIUM BROMIDE AND ALBUTEROL SULFATE 3 ML: 2.5; .5 SOLUTION RESPIRATORY (INHALATION) at 12:11

## 2022-11-10 RX ADMIN — SODIUM CHLORIDE: 0.9 INJECTION, SOLUTION INTRAVENOUS at 10:11

## 2022-11-10 RX ADMIN — IPRATROPIUM BROMIDE AND ALBUTEROL SULFATE 3 ML: 2.5; .5 SOLUTION RESPIRATORY (INHALATION) at 08:11

## 2022-11-10 RX ADMIN — Medication 1750 MG: at 03:11

## 2022-11-10 NOTE — ED PROVIDER NOTES
Encounter Date: 11/10/2022       History     Chief Complaint   Patient presents with    Influenza     Pt diagnosed with flu on 11/06/22 - presents to ED with worsening complaints weakness vomiting/diarrhea with productive cough. Room air SPO2 87%     37-year-old male with history of smoking recently diagnosed with influenza on November 6 presents with worsening of cough and shortness of breath.  Patient was discharged on Tamiflu but was not able to fill due to feeling weak and diffuse pain.  Patient says that his cough and shortness breath has progressively gotten worse.  Patient denies any history of blood clots, leg swelling, vomiting, chest pain    Review of patient's allergies indicates:  No Known Allergies  History reviewed. No pertinent past medical history.  No past surgical history on file.  No family history on file.  Social History     Tobacco Use    Smoking status: Smoker, Current Status Unknown     Packs/day: 1.00     Types: Cigarettes    Smokeless tobacco: Never   Substance Use Topics    Alcohol use: Not Currently    Drug use: Not Currently     Review of Systems   Constitutional:  Positive for fatigue and fever.   HENT: Negative.     Respiratory:  Positive for cough and shortness of breath.    Cardiovascular: Negative.    Gastrointestinal:  Positive for diarrhea, nausea and vomiting.   Genitourinary: Negative.    Musculoskeletal: Negative.    Skin: Negative.    Neurological:  Positive for weakness.   Psychiatric/Behavioral: Negative.     All other systems reviewed and are negative.    Physical Exam     Initial Vitals   BP Pulse Resp Temp SpO2   11/10/22 1139 11/10/22 1139 11/10/22 1139 11/10/22 1139 11/10/22 1140   107/60 (!) 120 18 98.6 °F (37 °C) (!) 87 %      MAP       --                Physical Exam    Nursing note and vitals reviewed.  Constitutional: Vital signs are normal.   Appears fatigued   HENT:   Head: Normocephalic and atraumatic.   Eyes: Conjunctivae and lids are normal.   Neck: Trachea  normal. Neck supple.   Cardiovascular:  Regular rhythm, normal heart sounds and normal pulses.           Pulmonary/Chest: He is in respiratory distress. He has no wheezes. He has no rhonchi. He has rales.   Speaking in full sentences   Abdominal: Abdomen is soft. Bowel sounds are normal. He exhibits no distension. There is no abdominal tenderness. There is no rebound and no guarding.   Musculoskeletal:         General: Normal range of motion.      Cervical back: Neck supple.     Neurological: He is alert and oriented to person, place, and time. He has normal strength. GCS eye subscore is 4. GCS verbal subscore is 5. GCS motor subscore is 6.   Skin: Skin is warm. Capillary refill takes less than 2 seconds.   Psychiatric: He has a normal mood and affect. His speech is normal. Thought content normal.       ED Course   Critical Care    Date/Time: 11/10/2022 1:34 PM  Performed by: Maldonado Sylvester MD  Authorized by: Maldonado Sylvester MD   Direct patient critical care time: 10 minutes  Additional history critical care time: 5 minutes  Ordering / reviewing critical care time: 5 minutes  Documentation critical care time: 5 minutes  Consulting other physicians critical care time: 5 minutes  Other critical care time: 5 minutes  Total critical care time (exclusive of procedural time) : 35 minutes  Critical care time was exclusive of separately billable procedures and treating other patients.  Critical care was necessary to treat or prevent imminent or life-threatening deterioration of the following conditions: cardiac failure and respiratory failure.  Critical care was time spent personally by me on the following activities: examination of patient, ordering and performing treatments and interventions, obtaining history from patient or surrogate, ordering and review of laboratory studies, ordering and review of radiographic studies, pulse oximetry, re-evaluation of patient's condition and review of old charts.      Labs Reviewed    CBC W/ AUTO DIFFERENTIAL - Abnormal; Notable for the following components:       Result Value    Lymph % 9.0 (*)     Mono % 2.0 (*)     All other components within normal limits   COMPREHENSIVE METABOLIC PANEL - Abnormal; Notable for the following components:    Sodium 131 (*)     Glucose 159 (*)     BUN 25 (*)     Calcium 8.5 (*)     Albumin 2.8 (*)     AST 44 (*)     All other components within normal limits   LACTIC ACID, PLASMA - Abnormal; Notable for the following components:    Lactate (Lactic Acid) 2.5 (*)     All other components within normal limits   CULTURE, BLOOD   CULTURE, BLOOD   NT-PRO NATRIURETIC PEPTIDE   TROPONIN I HIGH SENSITIVITY   PROTIME-INR   APTT   LACTIC ACID, PLASMA   URINALYSIS, REFLEX TO URINE CULTURE   SARS-COV-2 RDRP GENE    Narrative:     This test utilizes isothermal nucleic acid amplification technology to detect the SARS-CoV-2 RdRp nucleic acid segment. The analytical sensitivity (limit of detection) is 500 copies/swab.     A POSITIVE result is indicative of the presence of SARS-CoV-2 RNA; clinical correlation with patient history and other diagnostic information is necessary to determine patient infection status.    A NEGATIVE result means that SARS-CoV-2 nucleic acids are not present above the limit of detection. A NEGATIVE result should be treated as presumptive. It does not rule out the possibility of COVID-19 and should not be the sole basis for treatment decisions. If COVID-19 is strongly suspected based on clinical and exposure history, re-testing using an alternate molecular assay should be considered.     This test is only for use under the Food and Drug Administration s Emergency Use Authorization (EUA).     Commercial kits are provided by [employer]. Performance characteristics of the EUA have been independently verified by [facility] Department of Pathology and Laboratory Medicine.   _________________________________________________________________    The authorized Fact Sheet for Healthcare Providers and the authorized Fact Sheet for Patients of the ID NOW COVID-19 are available on the FDA website:    https://www.fda.gov/media/012365/download      https://www.fda.gov/media/515596/download        EKG Readings: (Independently Interpreted)   Initial Reading: No STEMI.   Sinus tachycardia.  Right axis deviation.  T-wave flattening in inferior leads   ECG Results              EKG 12-lead (Final result)  Result time 11/10/22 13:52:43      Final result by Interface, Lab In [facility] (11/10/22 13:52:43)                   Narrative:    Test Reason : R09.02,    Vent. Rate : 104 BPM     Atrial Rate : 104 BPM     P-R Int : 124 ms          QRS Dur : 094 ms      QT Int : 320 ms       P-R-T Axes : 086 091 066 degrees     QTc Int : 420 ms    Sinus tachycardia  Rightward axis  Otherwise normal ECG  No previous ECGs available  Confirmed by Lorraine Tamayo MD ([id number]) on 11/10/2022 1:52:36 PM    Referred By: [email or web address]   SELF           Confirmed By:Lorraine Tamayo MD                                  Imaging Results              X-Ray Chest AP Portable (Final result)  Result time 11/10/22 12:32:13      Final result by Jeremy Diamond MD (11/10/22 12:32:13)                   Impression:      Hazy and nodular pulmonary opacities bilaterally may indicate findings of pneumonia.  Underlying neoplastic disease is also in the differential.  Findings can be further evaluated with a follow-up contrast enhanced CT of the chest.      Electronically signed by: Jeremy Diamond MD  Date:    11/10/2022  Time:    12:32               Narrative:    EXAMINATION:  XR CHEST AP PORTABLE    CLINICAL HISTORY:  Sepsis;    TECHNIQUE:  Frontal view obtained of the chest    COMPARISON:  08/02/2022    FINDINGS:  No cardiac silhouette enlargement.  Discoid atelectasis versus scar at the left lower lung zone.  Question hazy airspace density at the left upper lung zone.  Hazy opacity is also present in the medial aspect of  the right lung base.  Nodular opacities are present at the junction of the right mid and upper lung zones as well as the right upper lung zone each measuring approximately 2-3 cm in diameter.                                       Medications   lactated ringers bolus 2,067 mL (0 mLs Intravenous Stopped 11/10/22 1340)   ondansetron injection 4 mg (4 mg Intravenous Given 11/10/22 1243)   oseltamivir capsule 75 mg (75 mg Oral Given 11/10/22 1244)   acetaminophen tablet 1,000 mg (1,000 mg Oral Given 11/10/22 1243)   albuterol-ipratropium 2.5 mg-0.5 mg/3 mL nebulizer solution 3 mL (3 mLs Nebulization Given 11/10/22 1241)   cefTRIAXone (ROCEPHIN) 1 g/50 mL D5W IVPB (0 g Intravenous Stopped 11/10/22 1335)     Medical Decision Making:   Initial Assessment:   37-year-old male with history of smoking recently diagnosed with influenza on November 6 presents with worsening of cough and shortness of breath.  Patient saturating high 80s on room air with improvement on 4 L to 94%.  Labs and imaging shows multifocal pneumonia.  Will treat patient with antibiotics.  Will admit for further medical management  Clinical Tests:   Lab Tests: Ordered and Reviewed  The following lab test(s) were unremarkable: CBC, CMP, Troponin and BNP  Radiological Study: Ordered and Reviewed  Medical Tests: Reviewed and Ordered           ED Course as of 11/10/22 1401   u Nov 10, 2022   1355 Labs and imaging noted.  Patient will be admitted for further medical management [HD]      ED Course User Index  [HD] Maldonado Sylvester MD                 Clinical Impression:   Final diagnoses:  [R09.02] Hypoxic  [J10.1] Influenza A  [J18.9] Pneumonia due to infectious organism, unspecified laterality, unspecified part of lung (Primary)  [A41.9] Sepsis, due to unspecified organism, unspecified whether acute organ dysfunction present  [R06.89] Respiratory insufficiency        ED Disposition Condition    Admit Fair                Maldonado Sylvester MD  11/10/22 1401

## 2022-11-10 NOTE — PROGRESS NOTES
Pharmacokinetic Initial Assessment: IV Vancomycin    Assessment/Plan:    Initiate intravenous vancomycin with loading dose of 1750 mg once followed by a maintenance dose of vancomycin 1000 mg IV every 12 hours  Desired empiric serum trough concentration is 10 to 20 mcg/mL  Draw vancomycin trough level 60 min prior to fourth dose on 11/12/22 at approximately 0230  Pharmacy will continue to follow and monitor vancomycin.      Please contact pharmacy at extension [phone] with any questions regarding this assessment.     Thank you for the consult,   Lluvia Hanks       Patient brief summary:  Kj Denney is a 37 y.o. male initiated on antimicrobial therapy with IV Vancomycin for treatment of suspected bacteremia    Drug Allergies:   Review of patient's allergies indicates:  No Known Allergies    Actual Body Weight:   68.9 kg    Renal Function:   Estimated Creatinine Clearance: 110.6 mL/min (based on SCr of 0.9 mg/dL).,     Dialysis Method (if applicable):  N/A    CBC (last 72 hours):  Recent Labs   Lab Result Units 11/10/22  1219   WBC K/uL 6.19   Hemoglobin g/dL 14.8   Hematocrit % 41.7   Platelets K/uL 177   Gran % % 53.0   Lymph % % 9.0*   Mono % % 2.0*   Eosinophil % % 0.0   Basophil % % 0.0   Differential Method  Manual       Metabolic Panel (last 72 hours):  Recent Labs   Lab Result Units 11/10/22  1219   Sodium mmol/L 131*   Potassium mmol/L 3.6   Chloride mmol/L 96   CO2 mmol/L 26   Glucose mg/dL 159*   BUN mg/dL 25*   Creatinine mg/dL 0.9   Albumin g/dL 2.8*   Total Bilirubin mg/dL 0.8   Alkaline Phosphatase U/L 70   AST U/L 44*   ALT U/L 27       Drug levels (last 3 results):  No results for input(s): VANCOMYCINRA, VANCORANDOM, VANCOMYCINPE, VANCOPEAK, VANCOMYCINTR, VANCOTROUGH in the last 72 hours.    Microbiologic Results:  Microbiology Results (last 7 days)       Procedure Component Value Units Date/Time    Blood culture x two cultures. Draw prior to antibiotics. [[id number]] Collected: 11/10/22 3796     Order Status: Sent Specimen: Blood Updated: 11/10/22 1231    Blood culture x two cultures. Draw prior to antibiotics. [[id number]] Collected: 11/10/22 1228    Order Status: Sent Specimen: Blood Updated: 11/10/22 1221

## 2022-11-10 NOTE — ED NOTES
Sepsis post fluid bolus initiation tissue perfusion exam:    See current vital signs.    Cardiopulmonary:   Heart: Regular rate and rhythm   Lungs: Clear to auscultation bilaterally    Capillary refill: < 3 seconds  Peripheral pulses: radial 2+ bilaterally  Skin: warm/dry/pink with good turgor

## 2022-11-11 PROBLEM — J18.9 PNEUMONIA DUE TO INFECTIOUS ORGANISM: Status: ACTIVE | Noted: 2022-11-11

## 2022-11-11 PROBLEM — R06.89 RESPIRATORY INSUFFICIENCY: Status: ACTIVE | Noted: 2022-11-11

## 2022-11-11 PROBLEM — A41.9 SEPSIS: Status: ACTIVE | Noted: 2022-11-11

## 2022-11-11 PROBLEM — J10.1 INFLUENZA A: Status: ACTIVE | Noted: 2022-11-11

## 2022-11-11 PROBLEM — R09.02 HYPOXIC: Status: ACTIVE | Noted: 2022-11-11

## 2022-11-11 LAB
ALBUMIN SERPL BCP-MCNC: 2.1 G/DL (ref 3.5–5.2)
ALP SERPL-CCNC: 71 U/L (ref 55–135)
ALT SERPL W/O P-5'-P-CCNC: 29 U/L (ref 10–44)
ANION GAP SERPL CALC-SCNC: 4 MMOL/L (ref 8–16)
AST SERPL-CCNC: 42 U/L (ref 10–40)
BASOPHILS # BLD AUTO: ABNORMAL K/UL (ref 0–0.2)
BASOPHILS NFR BLD: 0 % (ref 0–1.9)
BILIRUB SERPL-MCNC: 0.5 MG/DL (ref 0.1–1)
BUN SERPL-MCNC: 13 MG/DL (ref 6–20)
CALCIUM SERPL-MCNC: 7.6 MG/DL (ref 8.7–10.5)
CHLORIDE SERPL-SCNC: 103 MMOL/L (ref 95–110)
CO2 SERPL-SCNC: 30 MMOL/L (ref 23–29)
CREAT SERPL-MCNC: 0.7 MG/DL (ref 0.5–1.4)
DIFFERENTIAL METHOD: ABNORMAL
EOSINOPHIL # BLD AUTO: ABNORMAL K/UL (ref 0–0.5)
EOSINOPHIL NFR BLD: 0 % (ref 0–8)
ERYTHROCYTE [DISTWIDTH] IN BLOOD BY AUTOMATED COUNT: 12.2 % (ref 11.5–14.5)
EST. GFR  (NO RACE VARIABLE): >60 ML/MIN/1.73 M^2
GLUCOSE SERPL-MCNC: 150 MG/DL (ref 70–110)
HCT VFR BLD AUTO: 33.1 % (ref 40–54)
HGB BLD-MCNC: 11.8 G/DL (ref 14–18)
IMM GRANULOCYTES # BLD AUTO: ABNORMAL K/UL (ref 0–0.04)
IMM GRANULOCYTES NFR BLD AUTO: ABNORMAL % (ref 0–0.5)
LYMPHOCYTES # BLD AUTO: ABNORMAL K/UL (ref 1–4.8)
LYMPHOCYTES NFR BLD: 23 % (ref 18–48)
MAGNESIUM SERPL-MCNC: 2.1 MG/DL (ref 1.6–2.6)
MCH RBC QN AUTO: 29.9 PG (ref 27–31)
MCHC RBC AUTO-ENTMCNC: 35.6 G/DL (ref 32–36)
MCV RBC AUTO: 84 FL (ref 82–98)
MONOCYTES # BLD AUTO: ABNORMAL K/UL (ref 0.3–1)
MONOCYTES NFR BLD: 2 % (ref 4–15)
NEUTROPHILS NFR BLD: 64 % (ref 38–73)
NEUTS BAND NFR BLD MANUAL: 11 %
NRBC BLD-RTO: 0 /100 WBC
PLATELET # BLD AUTO: 135 K/UL (ref 150–450)
PMV BLD AUTO: 9.6 FL (ref 9.2–12.9)
POTASSIUM SERPL-SCNC: 3.6 MMOL/L (ref 3.5–5.1)
PROT SERPL-MCNC: 5.8 G/DL (ref 6–8.4)
RBC # BLD AUTO: 3.94 M/UL (ref 4.6–6.2)
SODIUM SERPL-SCNC: 137 MMOL/L (ref 136–145)
WBC # BLD AUTO: 3.92 K/UL (ref 3.9–12.7)

## 2022-11-11 PROCEDURE — 25000003 PHARM REV CODE 250: Performed by: INTERNAL MEDICINE

## 2022-11-11 PROCEDURE — 99900035 HC TECH TIME PER 15 MIN (STAT)

## 2022-11-11 PROCEDURE — 99900031 HC PATIENT EDUCATION (STAT)

## 2022-11-11 PROCEDURE — 85007 BL SMEAR W/DIFF WBC COUNT: CPT | Performed by: STUDENT IN AN ORGANIZED HEALTH CARE EDUCATION/TRAINING PROGRAM

## 2022-11-11 PROCEDURE — 27000207 HC ISOLATION

## 2022-11-11 PROCEDURE — 80053 COMPREHEN METABOLIC PANEL: CPT | Performed by: STUDENT IN AN ORGANIZED HEALTH CARE EDUCATION/TRAINING PROGRAM

## 2022-11-11 PROCEDURE — 36415 COLL VENOUS BLD VENIPUNCTURE: CPT | Performed by: INTERNAL MEDICINE

## 2022-11-11 PROCEDURE — 63600175 PHARM REV CODE 636 W HCPCS: Performed by: INTERNAL MEDICINE

## 2022-11-11 PROCEDURE — 27100171 HC OXYGEN HIGH FLOW UP TO 24 HOURS

## 2022-11-11 PROCEDURE — 63600175 PHARM REV CODE 636 W HCPCS: Performed by: STUDENT IN AN ORGANIZED HEALTH CARE EDUCATION/TRAINING PROGRAM

## 2022-11-11 PROCEDURE — 94640 AIRWAY INHALATION TREATMENT: CPT

## 2022-11-11 PROCEDURE — 85027 COMPLETE CBC AUTOMATED: CPT | Performed by: STUDENT IN AN ORGANIZED HEALTH CARE EDUCATION/TRAINING PROGRAM

## 2022-11-11 PROCEDURE — 25000003 PHARM REV CODE 250: Performed by: STUDENT IN AN ORGANIZED HEALTH CARE EDUCATION/TRAINING PROGRAM

## 2022-11-11 PROCEDURE — 11000001 HC ACUTE MED/SURG PRIVATE ROOM

## 2022-11-11 PROCEDURE — 83735 ASSAY OF MAGNESIUM: CPT | Performed by: INTERNAL MEDICINE

## 2022-11-11 PROCEDURE — 25000242 PHARM REV CODE 250 ALT 637 W/ HCPCS: Performed by: INTERNAL MEDICINE

## 2022-11-11 PROCEDURE — 27000221 HC OXYGEN, UP TO 24 HOURS

## 2022-11-11 PROCEDURE — 94761 N-INVAS EAR/PLS OXIMETRY MLT: CPT

## 2022-11-11 RX ORDER — GUAIFENESIN 600 MG/1
600 TABLET, EXTENDED RELEASE ORAL 2 TIMES DAILY
Status: DISCONTINUED | OUTPATIENT
Start: 2022-11-11 | End: 2022-11-12 | Stop reason: HOSPADM

## 2022-11-11 RX ORDER — GUAIFENESIN/DEXTROMETHORPHAN 100-10MG/5
10 SYRUP ORAL EVERY 4 HOURS PRN
Status: DISCONTINUED | OUTPATIENT
Start: 2022-11-11 | End: 2022-11-12 | Stop reason: HOSPADM

## 2022-11-11 RX ADMIN — SODIUM CHLORIDE: 0.9 INJECTION, SOLUTION INTRAVENOUS at 11:11

## 2022-11-11 RX ADMIN — CEFTRIAXONE 1 G: 1 INJECTION, SOLUTION INTRAVENOUS at 12:11

## 2022-11-11 RX ADMIN — IPRATROPIUM BROMIDE AND ALBUTEROL SULFATE 3 ML: 2.5; .5 SOLUTION RESPIRATORY (INHALATION) at 04:11

## 2022-11-11 RX ADMIN — ENOXAPARIN SODIUM 40 MG: 40 INJECTION SUBCUTANEOUS at 05:11

## 2022-11-11 RX ADMIN — IPRATROPIUM BROMIDE AND ALBUTEROL SULFATE 3 ML: 2.5; .5 SOLUTION RESPIRATORY (INHALATION) at 07:11

## 2022-11-11 RX ADMIN — IPRATROPIUM BROMIDE AND ALBUTEROL SULFATE 3 ML: 2.5; .5 SOLUTION RESPIRATORY (INHALATION) at 08:11

## 2022-11-11 RX ADMIN — ACETAMINOPHEN 650 MG: 325 TABLET ORAL at 05:11

## 2022-11-11 RX ADMIN — VANCOMYCIN HYDROCHLORIDE 1000 MG: 1 INJECTION, POWDER, LYOPHILIZED, FOR SOLUTION INTRAVENOUS at 03:11

## 2022-11-11 RX ADMIN — VANCOMYCIN HYDROCHLORIDE 1000 MG: 1 INJECTION, POWDER, LYOPHILIZED, FOR SOLUTION INTRAVENOUS at 04:11

## 2022-11-11 RX ADMIN — GUAIFENESIN 600 MG: 600 TABLET, EXTENDED RELEASE ORAL at 08:11

## 2022-11-11 RX ADMIN — CEFTRIAXONE 1 G: 1 INJECTION, SOLUTION INTRAVENOUS at 11:11

## 2022-11-11 RX ADMIN — GUAIFENESIN 600 MG: 600 TABLET, EXTENDED RELEASE ORAL at 10:11

## 2022-11-11 RX ADMIN — IPRATROPIUM BROMIDE AND ALBUTEROL SULFATE 3 ML: 2.5; .5 SOLUTION RESPIRATORY (INHALATION) at 12:11

## 2022-11-11 NOTE — SUBJECTIVE & OBJECTIVE
History reviewed. No pertinent past medical history.    History reviewed. No pertinent surgical history.    Review of patient's allergies indicates:  No Known Allergies    No current facility-administered medications on file prior to encounter.     Current Outpatient Medications on File Prior to Encounter   Medication Sig    albuterol (PROVENTIL HFA) 90 mcg/actuation inhaler Inhale 2 puffs into the lungs every 6 (six) hours as needed for Wheezing. Rescue    azithromycin (Z-LA) 250 MG tablet Take 1 tablet (250 mg total) by mouth once daily. Take first 2 tablets together, then 1 every day until finished.    fluticasone propionate (FLONASE) 50 mcg/actuation nasal spray 1 spray (50 mcg total) by Each Nostril route 2 (two) times daily as needed.    ibuprofen (ADVIL,MOTRIN) 600 MG tablet Take 1 tablet (600 mg total) by mouth every 6 (six) hours as needed for Pain.    ondansetron (ZOFRAN) 4 MG tablet Take 1 tablet (4 mg total) by mouth every 6 (six) hours.    oseltamivir (TAMIFLU) 75 MG capsule Take 1 capsule (75 mg total) by mouth 2 (two) times daily. for 5 days    promethazine-dextromethorphan (PROMETHAZINE-DM) 6.25-15 mg/5 mL Syrp Take 5 mLs by mouth every 4 (four) hours as needed.    traMADoL (ULTRAM) 50 mg tablet Take 1 tablet (50 mg total) by mouth every 6 (six) hours as needed for Pain.     Family History    None       Tobacco Use    Smoking status: Smoker, Current Status Unknown     Packs/day: 1.00     Types: Cigarettes    Smokeless tobacco: Never   Substance and Sexual Activity    Alcohol use: Not Currently    Drug use: Not Currently    Sexual activity: Not on file     Review of Systems   Constitutional:  Positive for activity change, chills, fatigue and fever.   HENT:  Positive for congestion and postnasal drip. Negative for sore throat.    Respiratory:  Positive for cough, shortness of breath and wheezing.    Cardiovascular:  Negative for chest pain, palpitations and leg swelling.   Gastrointestinal:  Negative  for abdominal pain, diarrhea, nausea and vomiting.   Genitourinary:  Negative for difficulty urinating.   Musculoskeletal:  Positive for arthralgias and myalgias. Negative for back pain.   Skin:  Negative for rash and wound.   Neurological:  Positive for weakness. Negative for seizures, syncope and speech difficulty.   Psychiatric/Behavioral:  Negative for agitation and behavioral problems.    Objective:     Vital Signs (Most Recent):  Temp: 98.7 °F (37.1 °C) (11/11/22 1149)  Pulse: 92 (11/11/22 1448)  Resp: 20 (11/11/22 1240)  BP: (!) 106/55 (11/11/22 1149)  SpO2: (S) 100 % (11/11/22 1249)   Vital Signs (24h Range):  Temp:  [97.8 °F (36.6 °C)-100.4 °F (38 °C)] 98.7 °F (37.1 °C)  Pulse:  [76-99] 92  Resp:  [18-20] 20  SpO2:  [90 %-100 %] 100 %  BP: ()/(52-64) 106/55     Weight: 68.9 kg (152 lb)  Body mass index is 21.81 kg/m².    Physical Exam  Constitutional:       Appearance: He is ill-appearing.   HENT:      Nose: Nose normal.      Mouth/Throat:      Mouth: Mucous membranes are moist.   Eyes:      Extraocular Movements: Extraocular movements intact.      Pupils: Pupils are equal, round, and reactive to light.   Cardiovascular:      Rate and Rhythm: Normal rate and regular rhythm.   Pulmonary:      Effort: Pulmonary effort is normal.      Breath sounds: Rhonchi present.      Comments: O2 via NC  Abdominal:      General: Bowel sounds are normal. There is no distension.      Palpations: Abdomen is soft.      Tenderness: There is no abdominal tenderness.   Musculoskeletal:         General: Normal range of motion.   Skin:     General: Skin is warm and dry.      Capillary Refill: Capillary refill takes less than 2 seconds.   Neurological:      General: No focal deficit present.      Mental Status: He is alert and oriented to person, place, and time.         CRANIAL NERVES     CN III, IV, VI   Pupils are equal, round, and reactive to light.     Significant Labs: All pertinent labs within the past 24 hours have  been reviewed.  Recent Lab Results         11/11/22  0557        Albumin 2.1       Alkaline Phosphatase 71       ALT 29       Anion Gap 4       AST 42       Bands 11.0       Baso # CANCELED  Comment: Result canceled by the ancillary.       Basophil % 0.0       BILIRUBIN TOTAL 0.5  Comment: For infants and newborns, interpretation of results should be based  on gestational age, weight and in agreement with clinical  observations.    Premature Infant recommended reference ranges:  Up to 24 hours.............<8.0 mg/dL  Up to 48 hours............<12.0 mg/dL  3-5 days..................<15.0 mg/dL  6-29 days.................<15.0 mg/dL    For patients on Eltrombopag therapy, use of Dimension [place] TBIL is   not   recommended.         BUN 13       Calcium 7.6       Chloride 103       CO2 30       Creatinine 0.7       Differential Method Manual  Comment: CORRECTED RESULT; previously reported as Automated on 11/11/2022 at   06:38.    [C]       eGFR >60.0       Eos # CANCELED  Comment: Result canceled by the ancillary.       Eosinophil % 0.0       Glucose 150       Gran % 64.0       Hematocrit 33.1       Hemoglobin 11.8       Immature Grans (Abs) CANCELED  Comment: Mild elevation in immature granulocytes is non specific and   can be seen in a variety of conditions including stress response,   acute inflammation, trauma and pregnancy. Correlation with other   laboratory and clinical findings is essential.    Result canceled by the ancillary.         Immature Granulocytes CANCELED  Comment: Result canceled by the ancillary.       Lymph # CANCELED  Comment: Result canceled by the ancillary.       Lymph % 23.0       Magnesium 2.1       MCH 29.9       MCHC 35.6       MCV 84       Mono # CANCELED  Comment: Result canceled by the ancillary.       Mono % 2.0       MPV 9.6       nRBC 0       Platelets 135       Potassium 3.6       PROTEIN TOTAL 5.8       RBC 3.94       RDW 12.2       Sodium 137       WBC 3.92                [C] -  Corrected Result               Significant Imaging: I have reviewed all pertinent imaging results/findings within the past 24 hours.

## 2022-11-11 NOTE — ASSESSMENT & PLAN NOTE
tx for post pneumo influenza pneumo - rocephin and vanc, mucinex, o2 and nebs - repeat cxr in am

## 2022-11-11 NOTE — PLAN OF CARE
Problem: Infection  Goal: Absence of Infection Signs and Symptoms  Outcome: Ongoing, Not Progressing         Problem: Adult Inpatient Plan of Care  Goal: Plan of Care Review  Outcome: Ongoing, Progressing  Goal: Patient-Specific Goal (Individualized)  Outcome: Ongoing, Progressing  Goal: Absence of Hospital-Acquired Illness or Injury  Outcome: Ongoing, Progressing  Goal: Optimal Comfort and Wellbeing  Outcome: Ongoing, Progressing  Goal: Readiness for Transition of Care  Outcome: Ongoing, Progressing

## 2022-11-11 NOTE — HPI
37-year-old male with history of smoking recently diagnosed with influenza on November 6 presents with worsening of cough and shortness of breath.  Patient was discharged on Tamiflu but was not able to fill due to feeling weak and diffuse pain.  Patient says that his cough and shortness breath has progressively gotten worse.  Patient denies any history of blood clots, leg swelling, vomiting, chest pain  Pt this am denies productive cough

## 2022-11-11 NOTE — ASSESSMENT & PLAN NOTE
This patient does have evidence of infective focus  My overall impression is sepsis. Vital signs were reviewed and noted in progress note.  Antibiotics given-   Antibiotics (From admission, onward)    Start     Stop Route Frequency Ordered    11/11/22 0330  vancomycin in dextrose 5 % 1 gram/250 mL IVPB 1,000 mg         -- IV Every 12 hours (non-standard times) 11/10/22 1514    11/11/22 0000  cefTRIAXone (ROCEPHIN) 1 g/50 mL D5W IVPB         -- IV Every 24 hours (non-standard times) 11/10/22 1429    11/10/22 1458  vancomycin - pharmacy to dose  (vancomycin IVPB)        See Hyperspace for full Linked Orders Report.    -- IV pharmacy to manage frequency 11/10/22 1429        Cultures were taken-   Microbiology Results (last 7 days)     Procedure Component Value Units Date/Time    Blood culture x two cultures. Draw prior to antibiotics. [[id number]] Collected: 11/10/22 1231    Order Status: Completed Specimen: Blood Updated: 11/11/22 1315     Blood Culture, Routine No Growth to date    Narrative:      Aerobic and anaerobic    Blood culture x two cultures. Draw prior to antibiotics. [[id number]] Collected: 11/10/22 1228    Order Status: Completed Specimen: Blood Updated: 11/11/22 1315     Blood Culture, Routine No Growth to date    Narrative:      Aerobic and anaerobic        Latest lactate reviewed, they are-  Recent Labs   Lab 11/10/22  1230 11/10/22  1547   LACTATE 2.5* 2.4*       Organ dysfunction indicated by Acute respiratory failure  Source- flu A, pneumo    Source control Achieved by- ivfs, iv abxs, isolation, o2 and nebs

## 2022-11-11 NOTE — PLAN OF CARE
[facility]  Initial Discharge Assessment       Primary Care Provider: CHRIS Beck    Admission Diagnosis: Respiratory insufficiency [R06.89]  Influenza A [J10.1]  Hypoxic [R09.02]  Pneumonia due to infectious organism, unspecified laterality, unspecified part of lung [J18.9]  Sepsis, due to unspecified organism, unspecified whether acute organ dysfunction present [A41.9]    Admission Date: 11/10/2022  Expected Discharge Date:     Discharge Barriers Identified: None    Payor: MEDICAID / Plan: [facility] ([employer]) / Product Type: Managed Medicaid /     Extended Emergency Contact Information  Primary Emergency Contact: SLADE JORGE  Mobile Phone: [phone]  Relation: Mother  Preferred language: English    Discharge Plan A: Home  Discharge Plan B: Home with family      [id number] - [place], [place] - [address] AT [facility] [address] [facility]  [address]  [place] [address]  Phone: [phone] Fax: [phone]    [id number] - [place], [place] - [address] AT [facility] [address]  [address]  [place] [address]  Phone: [phone] Fax: [phone]    [facility] - [place], [place] - [address]  [facility] [address]  Phone: [phone] Fax: [phone]      Initial Assessment (most recent)       Adult Discharge Assessment - 11/11/22 0727          Discharge Assessment    Assessment Type Discharge Planning Assessment     Confirmed/corrected address, phone number and insurance Yes     Confirmed Demographics Correct on Facesheet     Source of Information patient     Reason For Admission pneumonia     Lives With other relative(s)     Facility Arrived From: Patient states he lives with his sister in law, Nitin Jorge     Do you expect to return to your current living situation? Yes     Do you have help at home or someone to help you manage your care at home? Yes     Who  are your caregiver(s) and their phone number(s)? family     Prior to hospitilization cognitive status: Alert/Oriented     Current cognitive status: Alert/Oriented     Walking or Climbing Stairs Difficulty none     Dressing/Bathing Difficulty none     Equipment Currently Used at Home none     Readmission within 30 days? No     Patient currently being followed by outpatient case management? No     Do you currently have service(s) that help you manage your care at home? No     Do you have any problems affording any of your prescribed medications? TBD     Who is going to help you get home at discharge? family     Are you on dialysis? No     Do you take coumadin? No     Discharge Plan A Home     Discharge Plan B Home with family     DME Needed Upon Discharge  none     Discharge Plan discussed with: Patient     Discharge Barriers Identified None        Relationship/Environment    Name(s) of Who Lives With Patient Patient's main contact is his mother,  Anisha Denney  [phone].  she is at bedside now.                 Patient awakens easily  He is independent adult.  Able to drive self.  Lives with his sister in law, Keyanna Denney.  His mother Anisha Denney, is his main contact [phone], and she is currently at bedside. He uses [facility], stating his mother will help him  monitor medications.  Has no DME at home.  Plans to return home upon discharge,  No DME needs identified this visit.

## 2022-11-12 VITALS
TEMPERATURE: 98 F | HEART RATE: 63 BPM | WEIGHT: 152 LBS | DIASTOLIC BLOOD PRESSURE: 66 MMHG | SYSTOLIC BLOOD PRESSURE: 105 MMHG | BODY MASS INDEX: 21.76 KG/M2 | RESPIRATION RATE: 20 BRPM | OXYGEN SATURATION: 97 % | HEIGHT: 70 IN

## 2022-11-12 LAB
ALBUMIN SERPL BCP-MCNC: 2.2 G/DL (ref 3.5–5.2)
ALP SERPL-CCNC: 76 U/L (ref 55–135)
ALT SERPL W/O P-5'-P-CCNC: 23 U/L (ref 10–44)
ANION GAP SERPL CALC-SCNC: 2 MMOL/L (ref 8–16)
AST SERPL-CCNC: 27 U/L (ref 10–40)
BASOPHILS # BLD AUTO: ABNORMAL K/UL (ref 0–0.2)
BASOPHILS NFR BLD: 0 % (ref 0–1.9)
BILIRUB SERPL-MCNC: 0.3 MG/DL (ref 0.1–1)
BUN SERPL-MCNC: 12 MG/DL (ref 6–20)
CALCIUM SERPL-MCNC: 7.9 MG/DL (ref 8.7–10.5)
CHLORIDE SERPL-SCNC: 107 MMOL/L (ref 95–110)
CO2 SERPL-SCNC: 30 MMOL/L (ref 23–29)
CREAT SERPL-MCNC: 0.7 MG/DL (ref 0.5–1.4)
DIFFERENTIAL METHOD: ABNORMAL
EOSINOPHIL # BLD AUTO: ABNORMAL K/UL (ref 0–0.5)
EOSINOPHIL NFR BLD: 1 % (ref 0–8)
ERYTHROCYTE [DISTWIDTH] IN BLOOD BY AUTOMATED COUNT: 12.6 % (ref 11.5–14.5)
EST. GFR  (NO RACE VARIABLE): >60 ML/MIN/1.73 M^2
GLUCOSE SERPL-MCNC: 147 MG/DL (ref 70–110)
HCT VFR BLD AUTO: 33.9 % (ref 40–54)
HGB BLD-MCNC: 11.6 G/DL (ref 14–18)
IMM GRANULOCYTES # BLD AUTO: ABNORMAL K/UL (ref 0–0.04)
IMM GRANULOCYTES NFR BLD AUTO: ABNORMAL % (ref 0–0.5)
LYMPHOCYTES # BLD AUTO: ABNORMAL K/UL (ref 1–4.8)
LYMPHOCYTES NFR BLD: 21 % (ref 18–48)
MAGNESIUM SERPL-MCNC: 2.1 MG/DL (ref 1.6–2.6)
MCH RBC QN AUTO: 29 PG (ref 27–31)
MCHC RBC AUTO-ENTMCNC: 34.2 G/DL (ref 32–36)
MCV RBC AUTO: 85 FL (ref 82–98)
MONOCYTES # BLD AUTO: ABNORMAL K/UL (ref 0.3–1)
MONOCYTES NFR BLD: 11 % (ref 4–15)
NEUTROPHILS NFR BLD: 65 % (ref 38–73)
NEUTS BAND NFR BLD MANUAL: 2 %
NRBC BLD-RTO: 0 /100 WBC
PLATELET # BLD AUTO: 159 K/UL (ref 150–450)
PMV BLD AUTO: 10.3 FL (ref 9.2–12.9)
POTASSIUM SERPL-SCNC: 3.3 MMOL/L (ref 3.5–5.1)
PROT SERPL-MCNC: 5.8 G/DL (ref 6–8.4)
RBC # BLD AUTO: 4 M/UL (ref 4.6–6.2)
SODIUM SERPL-SCNC: 139 MMOL/L (ref 136–145)
VANCOMYCIN TROUGH SERPL-MCNC: 5.5 UG/ML (ref 10–22)
WBC # BLD AUTO: 4.36 K/UL (ref 3.9–12.7)

## 2022-11-12 PROCEDURE — 25000242 PHARM REV CODE 250 ALT 637 W/ HCPCS: Performed by: INTERNAL MEDICINE

## 2022-11-12 PROCEDURE — 80053 COMPREHEN METABOLIC PANEL: CPT | Performed by: INTERNAL MEDICINE

## 2022-11-12 PROCEDURE — 85027 COMPLETE CBC AUTOMATED: CPT | Performed by: INTERNAL MEDICINE

## 2022-11-12 PROCEDURE — 25000003 PHARM REV CODE 250: Performed by: INTERNAL MEDICINE

## 2022-11-12 PROCEDURE — 85007 BL SMEAR W/DIFF WBC COUNT: CPT | Performed by: INTERNAL MEDICINE

## 2022-11-12 PROCEDURE — 99900035 HC TECH TIME PER 15 MIN (STAT)

## 2022-11-12 PROCEDURE — 36415 COLL VENOUS BLD VENIPUNCTURE: CPT | Performed by: INTERNAL MEDICINE

## 2022-11-12 PROCEDURE — 94640 AIRWAY INHALATION TREATMENT: CPT

## 2022-11-12 PROCEDURE — 83735 ASSAY OF MAGNESIUM: CPT | Performed by: INTERNAL MEDICINE

## 2022-11-12 PROCEDURE — 27000221 HC OXYGEN, UP TO 24 HOURS

## 2022-11-12 PROCEDURE — 99900031 HC PATIENT EDUCATION (STAT)

## 2022-11-12 PROCEDURE — 63600175 PHARM REV CODE 636 W HCPCS: Performed by: INTERNAL MEDICINE

## 2022-11-12 PROCEDURE — 94761 N-INVAS EAR/PLS OXIMETRY MLT: CPT

## 2022-11-12 PROCEDURE — 80202 ASSAY OF VANCOMYCIN: CPT | Performed by: INTERNAL MEDICINE

## 2022-11-12 RX ORDER — LEVOFLOXACIN 750 MG/1
750 TABLET ORAL DAILY
Qty: 7 TABLET | Refills: 0 | Status: SHIPPED | OUTPATIENT
Start: 2022-11-12 | End: 2022-11-19

## 2022-11-12 RX ORDER — ALBUTEROL SULFATE 90 UG/1
1-2 AEROSOL, METERED RESPIRATORY (INHALATION) EVERY 6 HOURS PRN
Qty: 18 G | Refills: 0 | Status: SHIPPED | OUTPATIENT
Start: 2022-11-12 | End: 2023-11-12

## 2022-11-12 RX ORDER — ALBUTEROL SULFATE 90 UG/1
2 AEROSOL, METERED RESPIRATORY (INHALATION) EVERY 6 HOURS PRN
Qty: 18 G | Refills: 0 | Status: SHIPPED | OUTPATIENT
Start: 2022-11-12 | End: 2023-11-12

## 2022-11-12 RX ORDER — GUAIFENESIN 600 MG/1
600 TABLET, EXTENDED RELEASE ORAL 2 TIMES DAILY
Qty: 20 TABLET | Refills: 0 | COMMUNITY
Start: 2022-11-12 | End: 2022-11-22

## 2022-11-12 RX ADMIN — GUAIFENESIN 600 MG: 600 TABLET, EXTENDED RELEASE ORAL at 09:11

## 2022-11-12 RX ADMIN — VANCOMYCIN HYDROCHLORIDE 1500 MG: 1.5 INJECTION, POWDER, LYOPHILIZED, FOR SOLUTION INTRAVENOUS at 04:11

## 2022-11-12 RX ADMIN — IPRATROPIUM BROMIDE AND ALBUTEROL SULFATE 3 ML: 2.5; .5 SOLUTION RESPIRATORY (INHALATION) at 12:11

## 2022-11-12 RX ADMIN — IPRATROPIUM BROMIDE AND ALBUTEROL SULFATE 3 ML: 2.5; .5 SOLUTION RESPIRATORY (INHALATION) at 07:11

## 2022-11-12 NOTE — PLAN OF CARE
Plan of care reviewed at bedside with patient and family. Verbalized understanding. Patient on room air, O2 levels in high 90s. Will continue to monitor.

## 2022-11-12 NOTE — RESPIRATORY THERAPY
11/12/22 0737   Patient Assessment/Suction   Level of Consciousness (AVPU) alert   Respiratory Effort Unlabored;Normal   Expansion/Accessory Muscles/Retractions no use of accessory muscles;no retractions   All Lung Fields Breath Sounds diminished;equal bilaterally   Rhythm/Pattern, Respiratory unlabored;pattern regular   Cough Frequency infrequent   Cough Type nonproductive   PRE-TX-O2   O2 Device (Oxygen Therapy) room air   $ Is the patient on Low Flow Oxygen? Yes  (O2 running at bedside)   SpO2 97 %   Pulse Oximetry Type Intermittent   $ Pulse Oximetry - Multiple Charge Pulse Oximetry - Multiple   Pulse 88   Resp 18   Positioning Left side   Aerosol Therapy   $ Aerosol Therapy Charges Aerosol Treatment   Daily Review of Necessity (SVN) completed   Respiratory Treatment Status (SVN) given   Treatment Route (SVN) mouthpiece   Patient Position (SVN) HOB elevated   Post Treatment Assessment (SVN) breath sounds unchanged   Signs of Intolerance (SVN) none   Breath Sounds Post-Respiratory Treatment   Throughout All Fields Post-Treatment All Fields   Throughout All Fields Post-Treatment no change   Post-treatment Heart Rate (beats/min) 86   Post-treatment Resp Rate (breaths/min) 18   Respiratory Interventions   Cough And Deep Breathing done independently per patient   Education   $ Education 15 min   Respiratory Evaluation   $ Care Plan Tech Time 15 min     Patient found on room air at this time.  No distress noted.  Bharath LUDWIG aware.

## 2022-11-12 NOTE — DISCHARGE SUMMARY
[facility]  Discharge Summary      Patient Name: Kj Denney  MRN: [id number]  SUSANA: [id number]  Patient Class: IP- Inpatient  Admission Date: 11/10/2022  Hospital Length of Stay: 2 days  Discharge Date and Time:  11/12/2022 1:30 PM  Attending Physician: Jeanne Alvares MD   Discharging Provider: Peter Valdes MD  Primary Care Provider: CHRIS Beck    Primary Care Team: Networked reference to record PCT     HPI:   37-year-old male with history of smoking recently diagnosed with influenza on November 6 presents with worsening of cough and shortness of breath.  Patient was discharged on Tamiflu but was not able to fill due to feeling weak and diffuse pain.  Patient says that his cough and shortness breath has progressively gotten worse.  Patient denies any history of blood clots, leg swelling, vomiting, chest pain  Pt this am denies productive cough      * No surgery found *      Hospital Course:   11/12 AA, weekend crosscover, patient admitted with influenza, continued with breath although reported improvement, repeat chest x-ray appears worsen, multifocal pneumonia, on IV antibiotic, patient wanting to leave AMA, patient was advised to continue IV antibiotic treatment, patient signed AMA form, antibiotic sent to the pharmacy, advised to follow-up with PCP or return if symptoms worsen return to ED       Goals of Care Treatment Preferences:  Code Status: Full Code      Consults:   Consults (From admission, onward)        Status Ordering Provider     Pharmacy to dose Vancomycin consult  Once        Provider:  (Not yet assigned)   See Hyperspace for full Linked Orders Report.    Acknowledged CELESTE MARIN          * Sepsis  This patient does have evidence of infective focus  My overall impression is sepsis. Vital signs were reviewed and noted in progress note.  Antibiotics given-   Antibiotics (From admission, onward)    Start     Stop Route Frequency Ordered    11/12/22 3968   vancomycin 1.5 g in dextrose 5 % 250 mL IVPB (ready to mix)         -- IV Every 12 hours (non-standard times) 11/12/22 0320    11/11/22 0000  cefTRIAXone (ROCEPHIN) 1 g/50 mL D5W IVPB         -- IV Every 24 hours (non-standard times) 11/10/22 1429    11/10/22 1458  vancomycin - pharmacy to dose  (vancomycin IVPB)        See Hyperspace for full Linked Orders Report.    -- IV pharmacy to manage frequency 11/10/22 1429        Cultures were taken-   Microbiology Results (last 7 days)     Procedure Component Value Units Date/Time    Blood culture x two cultures. Draw prior to antibiotics. [[id number]] Collected: 11/10/22 1231    Order Status: Completed Specimen: Blood Updated: 11/12/22 0613     Blood Culture, Routine No Growth to date      No Growth to date    Narrative:      Aerobic and anaerobic    Blood culture x two cultures. Draw prior to antibiotics. [[id number]] Collected: 11/10/22 1228    Order Status: Completed Specimen: Blood Updated: 11/12/22 0613     Blood Culture, Routine No Growth to date      No Growth to date    Narrative:      Aerobic and anaerobic        Latest lactate reviewed, they are-  Recent Labs   Lab 11/10/22  1230 11/10/22  1547   LACTATE 2.5* 2.4*       Organ dysfunction indicated by Acute respiratory failure  Source- flu A, pneumo    Source control Achieved by- ivfs, iv abxs, isolation, o2 and nebs      Hypoxic  o2 protocal      Pneumonia due to infectious organism  tx for post pneumo influenza pneumo - rocephin and vanc, mucinex, o2 and nebs - repeat cxr in am  11/12 patient wanted to leave, chest x-ray multifocal pneumonia, worsening compared to previous, patient signed out AMA, Levaquin 750 daily for seven days sent to the pharmacy, patient advised to return to the ED if symptoms worsen    Influenza A  Isolation and symptomatic tx - over 5 days since first positive test      Respiratory insufficiency  o2 protocal        Final Active Diagnoses:    Diagnosis Date Noted POA    PRINCIPAL  PROBLEM:  Sepsis [A41.9] 11/11/2022 Yes    Respiratory insufficiency [R06.89] 11/11/2022 Yes    Influenza A [J10.1] 11/11/2022 Yes    Pneumonia due to infectious organism [J18.9] 11/11/2022 Yes    Hypoxic [R09.02] 11/11/2022 Yes      Problems Resolved During this Admission:       Discharged Condition: against medical advice    Disposition: Left Against Medical Adv*    Follow Up:   Follow-up Information     CHRIS Beck. Schedule an appointment as soon as possible for a visit in 1 week(s).    Specialty: Family Medicine  Why: hospital discharge follow-up  Contact information:  [address] [place] [id number]  [phone]                       Patient Instructions:      Diet Adult Regular     Notify your health care provider if you experience any of the following:  difficulty breathing or increased cough     Activity as tolerated       Significant Diagnostic Studies: Labs: All labs within the past 24 hours have been reviewed  Microbiology:   Blood Culture   Lab Results   Component Value Date    LABBLOO No Growth to date 11/10/2022    LABBLOO No Growth to date 11/10/2022   , Sputum Culture No results found for: GSRESP, RESPIRATORYC and Urine Culture  No results found for: LABURIN  Radiology: X-Ray: CXR: X-Ray Chest 1 View (CXR):   Results for orders placed or performed during the hospital encounter of 11/10/22   X-Ray Chest 1 View    Narrative    EXAMINATION:  XR CHEST 1 VIEW    CLINICAL HISTORY:  influenza pneumo;    TECHNIQUE:  portable chest x-ray    COMPARISON:  11/10/2022.    FINDINGS:  There is marked increase in the patchy opacities throughout the lungs consistent with multifocal pneumonia.  The areas have all increased particularly in the right upper lobe and left lung base.      Impression    Marked increase in the diffuse bilateral multifocal pneumonia      Electronically signed by: Stephy Brito MD  Date:    11/12/2022  Time:    10:43    and X-Ray Chest PA and Lateral (CXR): No results found  for this visit on 11/10/22.    Pending Diagnostic Studies:     None         Medications:  Reconciled Home Medications:      Medication List      START taking these medications    guaiFENesin 600 mg 12 hr tablet  Commonly known as: MUCINEX  Take 1 tablet (600 mg total) by mouth 2 (two) times daily. for 10 days     levoFLOXacin 750 MG tablet  Commonly known as: LEVAQUIN  Take 1 tablet (750 mg total) by mouth once daily. for 7 days        CHANGE how you take these medications    * albuterol 90 mcg/actuation inhaler  Commonly known as: PROVENTIL HFA  Inhale 1-2 puffs into the lungs every 6 (six) hours as needed for Wheezing or Shortness of Breath. Rescue  What changed:   · how much to take  · reasons to take this     * albuterol 90 mcg/actuation inhaler  Commonly known as: PROVENTIL HFA  Inhale 2 puffs into the lungs every 6 (six) hours as needed for Wheezing. Rescue  What changed: You were already taking a medication with the same name, and this prescription was added. Make sure you understand how and when to take each.         * This list has 2 medication(s) that are the same as other medications prescribed for you. Read the directions carefully, and ask your doctor or other care provider to review them with you.            CONTINUE taking these medications    fluticasone propionate 50 mcg/actuation nasal spray  Commonly known as: FLONASE  1 spray (50 mcg total) by Each Nostril route 2 (two) times daily as needed.     ibuprofen 600 MG tablet  Commonly known as: ADVIL,MOTRIN  Take 1 tablet (600 mg total) by mouth every 6 (six) hours as needed for Pain.     ondansetron 4 MG tablet  Commonly known as: ZOFRAN  Take 1 tablet (4 mg total) by mouth every 6 (six) hours.     promethazine-dextromethorphan 6.25-15 mg/5 mL Syrp  Commonly known as: PROMETHAZINE-DM  Take 5 mLs by mouth every 4 (four) hours as needed.     traMADoL 50 mg tablet  Commonly known as: ULTRAM  Take 1 tablet (50 mg total) by mouth every 6 (six) hours as  needed for Pain.        STOP taking these medications    azithromycin 250 MG tablet  Commonly known as: Z-LA     oseltamivir 75 MG capsule  Commonly known as: TAMIFLU            Indwelling Lines/Drains at time of discharge:   Lines/Drains/Airways     None                 Time spent on the discharge of patient: 32 minutes         Peter Valdes MD  Department of Hospital Medicine  [facility]

## 2022-11-12 NOTE — ASSESSMENT & PLAN NOTE
This patient does have evidence of infective focus  My overall impression is sepsis. Vital signs were reviewed and noted in progress note.  Antibiotics given-   Antibiotics (From admission, onward)    Start     Stop Route Frequency Ordered    11/12/22 0330  vancomycin 1.5 g in dextrose 5 % 250 mL IVPB (ready to mix)         -- IV Every 12 hours (non-standard times) 11/12/22 0320    11/11/22 0000  cefTRIAXone (ROCEPHIN) 1 g/50 mL D5W IVPB         -- IV Every 24 hours (non-standard times) 11/10/22 1429    11/10/22 1458  vancomycin - pharmacy to dose  (vancomycin IVPB)        See Hyperspace for full Linked Orders Report.    -- IV pharmacy to manage frequency 11/10/22 1429        Cultures were taken-   Microbiology Results (last 7 days)     Procedure Component Value Units Date/Time    Blood culture x two cultures. Draw prior to antibiotics. [[id number]] Collected: 11/10/22 1231    Order Status: Completed Specimen: Blood Updated: 11/12/22 0613     Blood Culture, Routine No Growth to date      No Growth to date    Narrative:      Aerobic and anaerobic    Blood culture x two cultures. Draw prior to antibiotics. [[id number]] Collected: 11/10/22 1228    Order Status: Completed Specimen: Blood Updated: 11/12/22 0613     Blood Culture, Routine No Growth to date      No Growth to date    Narrative:      Aerobic and anaerobic        Latest lactate reviewed, they are-  Recent Labs   Lab 11/10/22  1230 11/10/22  1547   LACTATE 2.5* 2.4*       Organ dysfunction indicated by Acute respiratory failure  Source- flu A, pneumo    Source control Achieved by- ivfs, iv abxs, isolation, o2 and nebs

## 2022-11-12 NOTE — PROGRESS NOTES
Pharmacokinetic Assessment Follow Up: IV Vancomycin    Vancomycin serum concentration assessment(s):    The trough level was drawn correctly and can be used to guide therapy at this time. The measurement is below the desired definitive target range of 10 to 20 mcg/mL.    Vancomycin Regimen Plan:    Change regimen to Vancomycin 1500 mg IV every 12 hours with next serum trough concentration measured at 1430 prior to 4th dose on 11/13/22    Drug levels (last 3 results):  Recent Labs   Lab Result Units 11/12/22  0239   Vancomycin-Trough ug/mL 5.5*       Pharmacy will continue to follow and monitor vancomycin.    Thank you for the consult,   Kindra Amanda       Patient brief summary:  Kj Denney is a 37 y.o. male initiated on antimicrobial therapy with IV Vancomycin for treatment of bacteremia      Drug Allergies:   Review of patient's allergies indicates:  No Known Allergies    Actual Body Weight:   68.9 kg    Renal Function:   Estimated Creatinine Clearance: 142.2 mL/min (based on SCr of 0.7 mg/dL).,     Dialysis Method (if applicable):  N/A    CBC (last 72 hours):  Recent Labs   Lab Result Units 11/10/22  1219 11/11/22  0557   WBC K/uL 6.19 3.92   Hemoglobin g/dL 14.8 11.8*   Hematocrit % 41.7 33.1*   Platelets K/uL 177 135*   Gran % % 53.0 64.0   Lymph % % 9.0* 23.0   Mono % % 2.0* 2.0*   Eosinophil % % 0.0 0.0   Basophil % % 0.0 0.0   Differential Method  Manual Manual       Metabolic Panel (last 72 hours):  Recent Labs   Lab Result Units 11/10/22  1219 11/11/22  0557 11/12/22  0239   Sodium mmol/L 131* 137 139   Potassium mmol/L 3.6 3.6 3.3*   Chloride mmol/L 96 103 107   CO2 mmol/L 26 30* 30*   Glucose mg/dL 159* 150* 147*   BUN mg/dL 25* 13 12   Creatinine mg/dL 0.9 0.7 0.7   Albumin g/dL 2.8* 2.1* 2.2*   Total Bilirubin mg/dL 0.8 0.5 0.3   Alkaline Phosphatase U/L 70 71 76   AST U/L 44* 42* 27   ALT U/L 27 29 23   Magnesium mg/dL  --  2.1 2.1       Vancomycin Administrations:  vancomycin given in the last 96  hours                     vancomycin in dextrose 5 % 1 gram/250 mL IVPB 1,000 mg (mg) 1,000 mg New Bag 11/11/22 1534     1,000 mg New Bag  0408    vancomycin 1750 mg in 0.9% sodium chloride 500 mL IVPB (mg) 1,750 mg New Bag 11/10/22 1555                    Microbiologic Results:  Microbiology Results (last 7 days)       Procedure Component Value Units Date/Time    Blood culture x two cultures. Draw prior to antibiotics. [[id number]] Collected: 11/10/22 1231    Order Status: Completed Specimen: Blood Updated: 11/11/22 1315     Blood Culture, Routine No Growth to date    Narrative:      Aerobic and anaerobic    Blood culture x two cultures. Draw prior to antibiotics. [[id number]] Collected: 11/10/22 1228    Order Status: Completed Specimen: Blood Updated: 11/11/22 1315     Blood Culture, Routine No Growth to date    Narrative:      Aerobic and anaerobic

## 2022-11-12 NOTE — NURSING
IV and tele d/c. Pt instructed that Dr. Valdes thinks he needs to stay due to his chest x-ray this morning being worse than previous. Pt verbalized understanding and signed AMA paper.

## 2022-11-12 NOTE — HOSPITAL COURSE
11/12 AA, weekend crosscover, patient admitted with influenza, continued with breath although reported improvement, repeat chest x-ray appears worsen, multifocal pneumonia, on IV antibiotic, patient wanting to leave AMA, patient was advised to continue IV antibiotic treatment, patient signed AMA form, antibiotic sent to the pharmacy, advised to follow-up with PCP or return if symptoms worsen return to ED

## 2022-11-12 NOTE — ASSESSMENT & PLAN NOTE
tx for post pneumo influenza pneumo - rocephin and vanc, mucinex, o2 and nebs - repeat cxr in am  11/12 patient wanted to leave, chest x-ray multifocal pneumonia, worsening compared to previous, patient signed out AMA, Levaquin 750 daily for seven days sent to the pharmacy, patient advised to return to the ED if symptoms worsen

## 2022-11-12 NOTE — NURSING
Pt noted trying to leave floor. Pt states he needs some fresh air. Pt informed that he cannot leave the floor with iv access. Pt states he wants to leave. Pt instructed to wait for Dr. Valdes, pt agreed. Dr. Valdes informed.

## 2022-11-16 LAB
BACTERIA BLD CULT: NORMAL
BACTERIA BLD CULT: NORMAL

## 2022-12-05 ENCOUNTER — HOSPITAL ENCOUNTER (EMERGENCY)
Facility: HOSPITAL | Age: 37
Discharge: HOME | End: 2022-12-05
Attending: EMERGENCY MEDICINE
Payer: MEDICAID

## 2022-12-05 VITALS
BODY MASS INDEX: 21.9 KG/M2 | RESPIRATION RATE: 18 BRPM | DIASTOLIC BLOOD PRESSURE: 72 MMHG | WEIGHT: 153 LBS | OXYGEN SATURATION: 99 % | TEMPERATURE: 98 F | HEART RATE: 78 BPM | HEIGHT: 70 IN | SYSTOLIC BLOOD PRESSURE: 117 MMHG

## 2022-12-05 DIAGNOSIS — K08.89 DENTALGIA: Primary | ICD-10-CM

## 2022-12-05 PROCEDURE — 99284 EMERGENCY DEPT VISIT MOD MDM: CPT

## 2022-12-05 RX ORDER — DICLOFENAC SODIUM 75 MG/1
75 TABLET, DELAYED RELEASE ORAL 2 TIMES DAILY
Qty: 14 TABLET | Refills: 0 | Status: SHIPPED | OUTPATIENT
Start: 2022-12-05 | End: 2022-12-12

## 2022-12-05 RX ORDER — LIDOCAINE HYDROCHLORIDE 40 MG/ML
4 SOLUTION TOPICAL 3 TIMES DAILY
Qty: 50 ML | Refills: 0 | Status: SHIPPED | OUTPATIENT
Start: 2022-12-05

## 2022-12-05 NOTE — DISCHARGE INSTRUCTIONS
Of injury keep your appointment with the dentist.  Try using clove oil which you can find at the pharmacy. You can also take the viscous lidocaine placed on a  piece of cotton swab and placed between your cheek and tooth.   The diclofenac can be used for pain, take 1 tablet twice a day as needed.

## 2022-12-05 NOTE — ED PROVIDER NOTES
Encounter Date: 12/5/2022       History     Chief Complaint   Patient presents with    Dental Pain     Complains of dental pain to right upper and lower jaw. Intermittent x 2 weeks. Has dentist appt 12/13/22     Per 37-year-old male to ED with complaints of right lower rear dental pain for 2 weeks.  Pain has been intermittent and he has a dental appointment scheduled on December 13th.  He has been using Anbesol and Oraljel without improvement in symptoms is also takes Tylenol.  Denies any fever.    The history is provided by the patient.   Review of patient's allergies indicates:  No Known Allergies  History reviewed. No pertinent past medical history.  No past surgical history on file.  No family history on file.  Social History     Tobacco Use    Smoking status: Smoker, Current Status Unknown     Packs/day: 1.00     Types: Cigarettes    Smokeless tobacco: Never   Substance Use Topics    Alcohol use: Not Currently    Drug use: Not Currently     Review of Systems   Constitutional:  Negative for fatigue and fever.   HENT:  Positive for dental problem.    Eyes: Negative.    Respiratory:  Negative for cough and shortness of breath.    Cardiovascular:  Negative for chest pain and palpitations.   Gastrointestinal: Negative.    Endocrine: Negative.    Genitourinary: Negative.    Musculoskeletal: Negative.    Skin: Negative.    Allergic/Immunologic: Negative.    Neurological:  Negative for dizziness and headaches.   Hematological: Negative.    Psychiatric/Behavioral: Negative.       Physical Exam     Initial Vitals [12/05/22 1121]   BP Pulse Resp Temp SpO2   117/72 78 18 98 °F (36.7 °C) 99 %      MAP       --         Physical Exam    Nursing note and vitals reviewed.  Constitutional: He appears well-developed and well-nourished.   HENT:   Head: Atraumatic.   Mouth/Throat: No trismus in the jaw. No dental abscesses.       Eyes: EOM are normal.   Neck: Neck supple.   Normal range of motion.  Cardiovascular:  Normal rate and  regular rhythm.           Abdominal: Abdomen is soft.   Musculoskeletal:         General: Normal range of motion.      Cervical back: Normal range of motion and neck supple.     Neurological: He is alert and oriented to person, place, and time.   Skin: Skin is warm and dry.   Psychiatric: He has a normal mood and affect. Thought content normal.       ED Course   Procedures  Labs Reviewed - No data to display       Imaging Results    None          Medications - No data to display  Medical Decision Making:   ED Management:  37-year-old male to ED for dental complaints.  He has got multiple cracked and decayed teeth noted.  No signs of abscess were noted.  He has a dental appointment neck is Tuesday.  I will send him home with viscous lidocaine and diclofenac.  Also suggested he try clove oil.  There is no oral swelling noted.  No signs of an abscess.  No trismus noted.                        Clinical Impression:   Final diagnoses:  [K08.89] Dentalgia (Primary)      ED Disposition Condition    Discharge Stable          ED Prescriptions       Medication Sig Dispense Start Date End Date Auth. Provider    LIDOcaine HCL 4% (XYLOCAINE) 4 % (40 mg/mL) external solution Apply 4 mLs topically 3 (three) times daily. 50 mL 12/5/2022 -- Juan Wyatt NP    diclofenac (VOLTAREN) 75 MG EC tablet Take 1 tablet (75 mg total) by mouth 2 (two) times daily. for 7 days 14 tablet 12/5/2022 12/12/2022 Juan Wyatt NP          Follow-up Information    None          Juan Wyatt NP  12/05/22 7919

## 2023-02-13 PROBLEM — J18.9 PNEUMONIA DUE TO INFECTIOUS ORGANISM: Status: RESOLVED | Noted: 2022-11-11 | Resolved: 2023-02-13

## 2023-02-13 PROBLEM — A41.9 SEPSIS: Status: RESOLVED | Noted: 2022-11-11 | Resolved: 2023-02-13

## 2023-05-02 ENCOUNTER — HOSPITAL ENCOUNTER (EMERGENCY)
Facility: HOSPITAL | Age: 38
Discharge: HOME | End: 2023-05-02
Attending: STUDENT IN AN ORGANIZED HEALTH CARE EDUCATION/TRAINING PROGRAM
Payer: MEDICAID

## 2023-05-02 VITALS
TEMPERATURE: 98 F | DIASTOLIC BLOOD PRESSURE: 82 MMHG | WEIGHT: 148 LBS | RESPIRATION RATE: 18 BRPM | HEIGHT: 70 IN | HEART RATE: 100 BPM | BODY MASS INDEX: 21.19 KG/M2 | SYSTOLIC BLOOD PRESSURE: 121 MMHG | OXYGEN SATURATION: 98 %

## 2023-05-02 DIAGNOSIS — J06.9 VIRAL URI: Primary | ICD-10-CM

## 2023-05-02 LAB
CTP QC/QA: YES
SARS-COV-2 RDRP RESP QL NAA+PROBE: NEGATIVE

## 2023-05-02 PROCEDURE — 99284 EMERGENCY DEPT VISIT MOD MDM: CPT

## 2023-05-02 RX ORDER — BUTALBITAL, ACETAMINOPHEN AND CAFFEINE 50; 325; 40 MG/1; MG/1; MG/1
1 TABLET ORAL EVERY 4 HOURS PRN
Qty: 10 TABLET | Refills: 0 | Status: SHIPPED | OUTPATIENT
Start: 2023-05-02 | End: 2023-06-01

## 2023-05-02 RX ORDER — PREDNISONE 20 MG/1
20 TABLET ORAL DAILY
Qty: 5 TABLET | Refills: 0 | Status: SHIPPED | OUTPATIENT
Start: 2023-05-02 | End: 2023-05-07

## 2023-05-02 RX ORDER — CETIRIZINE HYDROCHLORIDE 10 MG/1
10 TABLET ORAL DAILY
Qty: 30 TABLET | Refills: 0 | Status: SHIPPED | OUTPATIENT
Start: 2023-05-02 | End: 2024-05-01

## 2023-05-02 NOTE — Clinical Note
"Kj"Yulia Denney was seen and treated in our emergency department on 5/2/2023.  He may return to work on 05/03/2023.       If you have any questions or concerns, please don't hesitate to call.      Maldonado Sylvester MD"

## 2023-05-02 NOTE — ED PROVIDER NOTES
Encounter Date: 5/2/2023       History     Chief Complaint   Patient presents with    Headache    Nasal Congestion     Pt stated that he has been experiencing headache / runny nose for the past few days.      37-year-old male presents to the emergency room with headache, nasal congestion, runny nose for the last few days.  Has tried over-the-counter medication for his headache with no relief      Review of patient's allergies indicates:  No Known Allergies  History reviewed. No pertinent past medical history.  No past surgical history on file.  No family history on file.  Social History     Tobacco Use    Smoking status: Smoker, Current Status Unknown     Packs/day: 1.00     Types: Cigarettes    Smokeless tobacco: Never   Substance Use Topics    Alcohol use: Not Currently    Drug use: Not Currently     Review of Systems   Constitutional:  Negative for fever.   HENT:  Positive for congestion and rhinorrhea. Negative for sore throat.    Respiratory:  Negative for shortness of breath.    Cardiovascular:  Negative for chest pain.   Gastrointestinal:  Negative for nausea.   Genitourinary:  Negative for dysuria.   Musculoskeletal:  Negative for back pain.   Skin:  Negative for rash.   Neurological:  Positive for headaches. Negative for weakness.   Hematological:  Does not bruise/bleed easily.   All other systems reviewed and are negative.    Physical Exam     Initial Vitals [05/02/23 1632]   BP Pulse Resp Temp SpO2   121/82 100 18 97.6 °F (36.4 °C) 98 %      MAP       --         Physical Exam    Nursing note and vitals reviewed.  Constitutional: He appears well-developed and well-nourished.   HENT:   Head: Normocephalic and atraumatic.   Mouth/Throat: Posterior oropharyngeal erythema present.   Eyes: Pupils are equal, round, and reactive to light.   Cardiovascular:  Normal rate and regular rhythm.           Pulmonary/Chest: Breath sounds normal.   Abdominal: Abdomen is soft. Bowel sounds are normal.   Musculoskeletal:          General: Normal range of motion.     Neurological: He is alert and oriented to person, place, and time.   Psychiatric: He has a normal mood and affect.       ED Course   Procedures  Labs Reviewed   SARS-COV-2 RDRP GENE    Narrative:     .This test utilizes isothermal nucleic acid amplification technology to detect the SARS-CoV-2 RdRp nucleic acid segment. The analytical sensitivity (limit of detection) is 500 copies/swab.     A POSITIVE result is indicative of the presence of SARS-CoV-2 RNA; clinical correlation with patient history and other diagnostic information is necessary to determine patient infection status.    A NEGATIVE result means that SARS-CoV-2 nucleic acids are not present above the limit of detection. A NEGATIVE result should be treated as presumptive. It does not rule out the possibility of COVID-19 and should not be the sole basis for treatment decisions. If COVID-19 is strongly suspected based on clinical and exposure history, re-testing using an alternate molecular assay should be considered.     This test is only for use under the Food and Drug Administration s Emergency Use Authorization (EUA).     Commercial kits are provided by [employer]. Performance characteristics of the EUA have been independently verified by [facility] Department of Pathology and Laboratory Medicine.   _________________________________________________________________   The authorized Fact Sheet for Healthcare Providers and the authorized Fact Sheet for Patients of the ID NOW COVID-19 are available on the FDA website:    https://www.fda.gov/media/386278/download      https://www.fda.gov/media/054774/download              Imaging Results    None          Medications - No data to display  Medical Decision Making:   Differential Diagnosis:   URI, COVID  Clinical Tests:   Lab Tests: Ordered and Reviewed                        Clinical Impression:   Final diagnoses:  [J06.9] Viral URI (Primary)         ED Disposition Condition    Discharge Stable          ED Prescriptions       Medication Sig Dispense Start Date End Date Auth. Provider    butalbital-acetaminophen-caffeine -40 mg (FIORICET, ESGIC) -40 mg per tablet Take 1 tablet by mouth every 4 (four) hours as needed for Pain. 10 tablet 5/2/2023 6/1/2023 Irma Espino NP    cetirizine (ZYRTEC) 10 MG tablet Take 1 tablet (10 mg total) by mouth once daily. 30 tablet 5/2/2023 5/1/2024 Irma Espino NP    predniSONE (DELTASONE) 20 MG tablet Take 1 tablet (20 mg total) by mouth once daily. for 5 days 5 tablet 5/2/2023 5/7/2023 Irma Espino NP          Follow-up Information       Follow up With Specialties Details Why Contact Info    Sara Osullivan, FNP Family Medicine  As needed [address] [place] [id number]  [phone]               Irma Espino NP  05/02/23 5827

## 2024-06-07 ENCOUNTER — HOSPITAL ENCOUNTER (EMERGENCY)
Facility: HOSPITAL | Age: 39
Discharge: HOME | End: 2024-06-08
Attending: EMERGENCY MEDICINE
Payer: MEDICAID

## 2024-06-07 DIAGNOSIS — R53.83 MALAISE AND FATIGUE: Primary | ICD-10-CM

## 2024-06-07 DIAGNOSIS — F15.10 AMPHETAMINE ABUSE: ICD-10-CM

## 2024-06-07 DIAGNOSIS — R53.81 MALAISE AND FATIGUE: Primary | ICD-10-CM

## 2024-06-07 LAB
ALBUMIN SERPL BCP-MCNC: 3.6 G/DL (ref 3.5–5.2)
ALP SERPL-CCNC: 98 U/L (ref 55–135)
ALT SERPL W/O P-5'-P-CCNC: 20 U/L (ref 10–44)
ANION GAP SERPL CALC-SCNC: 4 MMOL/L (ref 3–11)
AST SERPL-CCNC: 18 U/L (ref 10–40)
BASOPHILS # BLD AUTO: 0.05 K/UL (ref 0–0.2)
BASOPHILS NFR BLD: 0.9 % (ref 0–1.9)
BILIRUB SERPL-MCNC: 0.3 MG/DL (ref 0.1–1)
BUN SERPL-MCNC: 13 MG/DL (ref 6–20)
CALCIUM SERPL-MCNC: 8.8 MG/DL (ref 8.7–10.5)
CHLORIDE SERPL-SCNC: 105 MMOL/L (ref 95–110)
CO2 SERPL-SCNC: 29 MMOL/L (ref 23–29)
CREAT SERPL-MCNC: 1 MG/DL (ref 0.5–1.4)
DIFFERENTIAL METHOD BLD: ABNORMAL
EOSINOPHIL # BLD AUTO: 0.4 K/UL (ref 0–0.5)
EOSINOPHIL NFR BLD: 7.1 % (ref 0–8)
ERYTHROCYTE [DISTWIDTH] IN BLOOD BY AUTOMATED COUNT: 12.6 % (ref 11.5–14.5)
EST. GFR  (NO RACE VARIABLE): >60 ML/MIN/1.73 M^2
GLUCOSE SERPL-MCNC: 84 MG/DL (ref 70–110)
HCT VFR BLD AUTO: 44.4 % (ref 40–54)
HGB BLD-MCNC: 15.3 G/DL (ref 14–18)
IMM GRANULOCYTES # BLD AUTO: 0.03 K/UL (ref 0–0.04)
IMM GRANULOCYTES NFR BLD AUTO: 0.5 % (ref 0–0.5)
LYMPHOCYTES # BLD AUTO: 1.1 K/UL (ref 1–4.8)
LYMPHOCYTES NFR BLD: 19.3 % (ref 18–48)
MCH RBC QN AUTO: 29.7 PG (ref 27–31)
MCHC RBC AUTO-ENTMCNC: 34.5 G/DL (ref 32–36)
MCV RBC AUTO: 86 FL (ref 82–98)
MONOCYTES # BLD AUTO: 0.8 K/UL (ref 0.3–1)
MONOCYTES NFR BLD: 13.7 % (ref 4–15)
NEUTROPHILS # BLD AUTO: 3.2 K/UL (ref 1.8–7.7)
NEUTROPHILS NFR BLD: 58.5 % (ref 38–73)
NRBC BLD-RTO: 0 /100 WBC
PLATELET # BLD AUTO: 184 K/UL (ref 150–450)
PMV BLD AUTO: 9 FL (ref 9.2–12.9)
POTASSIUM SERPL-SCNC: 4.5 MMOL/L (ref 3.5–5.1)
PROT SERPL-MCNC: 7.4 G/DL (ref 6–8.4)
RBC # BLD AUTO: 5.15 M/UL (ref 4.6–6.2)
SODIUM SERPL-SCNC: 138 MMOL/L (ref 136–145)
WBC # BLD AUTO: 5.49 K/UL (ref 3.9–12.7)

## 2024-06-07 PROCEDURE — 36415 COLL VENOUS BLD VENIPUNCTURE: CPT | Performed by: EMERGENCY MEDICINE

## 2024-06-07 PROCEDURE — 25000003 PHARM REV CODE 250: Performed by: EMERGENCY MEDICINE

## 2024-06-07 PROCEDURE — 85025 COMPLETE CBC W/AUTO DIFF WBC: CPT | Performed by: EMERGENCY MEDICINE

## 2024-06-07 PROCEDURE — 80053 COMPREHEN METABOLIC PANEL: CPT | Performed by: EMERGENCY MEDICINE

## 2024-06-07 PROCEDURE — 99284 EMERGENCY DEPT VISIT MOD MDM: CPT | Mod: 25

## 2024-06-07 PROCEDURE — 96361 HYDRATE IV INFUSION ADD-ON: CPT

## 2024-06-07 RX ADMIN — SODIUM CHLORIDE 1000 ML: 9 INJECTION, SOLUTION INTRAVENOUS at 10:06

## 2024-06-08 VITALS
HEART RATE: 68 BPM | SYSTOLIC BLOOD PRESSURE: 104 MMHG | WEIGHT: 153 LBS | BODY MASS INDEX: 23.19 KG/M2 | HEIGHT: 68 IN | RESPIRATION RATE: 16 BRPM | TEMPERATURE: 98 F | DIASTOLIC BLOOD PRESSURE: 70 MMHG | OXYGEN SATURATION: 99 %

## 2024-06-08 LAB
AMPHET+METHAMPHET UR QL: ABNORMAL
BARBITURATES UR QL SCN>200 NG/ML: NEGATIVE
BENZODIAZ UR QL SCN>200 NG/ML: NEGATIVE
BILIRUB UR QL STRIP: NEGATIVE
BZE UR QL SCN: NEGATIVE
CANNABINOIDS UR QL SCN: NEGATIVE
CLARITY UR: CLEAR
COLOR UR: YELLOW
CREAT UR-MCNC: 185 MG/DL (ref 23–375)
GLUCOSE UR QL STRIP: NEGATIVE
HGB UR QL STRIP: NEGATIVE
KETONES UR QL STRIP: NEGATIVE
LEUKOCYTE ESTERASE UR QL STRIP: NEGATIVE
METHADONE UR QL SCN>300 NG/ML: NEGATIVE
NITRITE UR QL STRIP: NEGATIVE
OPIATES UR QL SCN: NEGATIVE
PCP UR QL SCN>25 NG/ML: NEGATIVE
PH UR STRIP: 7 [PH] (ref 5–8)
PROT UR QL STRIP: NEGATIVE
SP GR UR STRIP: 1.02 (ref 1–1.03)
TOXICOLOGY INFORMATION: ABNORMAL
URN SPEC COLLECT METH UR: NORMAL
UROBILINOGEN UR STRIP-ACNC: 1 EU/DL

## 2024-06-08 PROCEDURE — 96374 THER/PROPH/DIAG INJ IV PUSH: CPT

## 2024-06-08 PROCEDURE — 80307 DRUG TEST PRSMV CHEM ANLYZR: CPT | Performed by: EMERGENCY MEDICINE

## 2024-06-08 PROCEDURE — 81003 URINALYSIS AUTO W/O SCOPE: CPT | Performed by: EMERGENCY MEDICINE

## 2024-06-08 PROCEDURE — 63600175 PHARM REV CODE 636 W HCPCS: Performed by: EMERGENCY MEDICINE

## 2024-06-08 RX ORDER — KETOROLAC TROMETHAMINE 30 MG/ML
15 INJECTION, SOLUTION INTRAMUSCULAR; INTRAVENOUS
Status: COMPLETED | OUTPATIENT
Start: 2024-06-08 | End: 2024-06-08

## 2024-06-08 RX ADMIN — KETOROLAC TROMETHAMINE 15 MG: 30 INJECTION, SOLUTION INTRAMUSCULAR; INTRAVENOUS at 12:06

## 2024-06-08 NOTE — ED PROVIDER NOTES
Encounter Date: 6/7/2024       History     Chief Complaint   Patient presents with    Nausea     Nausea and runny nose x 2 days. No vomiting.      39 yo male here via POV with his mother with complaints of not feeling well generally over the last few days. Patient reports last time he felt this way he was hospitalized for a week with bacteremia. No fever. Nauseated with rhinorrhea. No vomiting. No abd pain. No cough or dyspnea. No diarrhea. No rash.       Review of patient's allergies indicates:  No Known Allergies  No past medical history on file.  No past surgical history on file.  No family history on file.  Social History     Tobacco Use    Smoking status: Smoker, Current Status Unknown     Current packs/day: 1.00     Types: Cigarettes    Smokeless tobacco: Never   Substance Use Topics    Alcohol use: Not Currently    Drug use: Not Currently     Review of Systems   Constitutional:  Positive for fatigue.   HENT:  Positive for rhinorrhea. Negative for congestion.    Respiratory: Negative.     Cardiovascular: Negative.    Gastrointestinal:  Positive for nausea. Negative for abdominal pain and vomiting.   All other systems reviewed and are negative.      Physical Exam     Initial Vitals [06/07/24 2201]   BP Pulse Resp Temp SpO2   98/74 85 18 98.3 °F (36.8 °C) 98 %      MAP       --         Physical Exam    Nursing note and vitals reviewed.  Constitutional: He appears well-developed and well-nourished. He is not diaphoretic. No distress.   HENT:   Head: Normocephalic and atraumatic.   Eyes: EOM are normal. Pupils are equal, round, and reactive to light.   Neck: Neck supple.   Normal range of motion.  Cardiovascular:  Normal rate, regular rhythm and intact distal pulses.           Pulmonary/Chest: Breath sounds normal. No respiratory distress. He has no wheezes. He has no rales.   Abdominal: Abdomen is soft. Bowel sounds are normal. He exhibits no distension. There is no abdominal tenderness. There is no rebound.    Musculoskeletal:         General: No tenderness or edema. Normal range of motion.      Cervical back: Normal range of motion and neck supple.     Neurological: He is alert and oriented to person, place, and time.   Skin: Skin is warm and dry. Capillary refill takes less than 2 seconds.   Psychiatric: He has a normal mood and affect. Thought content normal.         ED Course   Procedures  Labs Reviewed   CBC W/ AUTO DIFFERENTIAL - Abnormal; Notable for the following components:       Result Value    MPV 9.0 (*)     All other components within normal limits   DRUG SCREEN PANEL, URINE EMERGENCY - Abnormal; Notable for the following components:    Amphetamine Screen, Ur Presumptive Positive (*)     All other components within normal limits    Narrative:     Preferred Collection Type->Urine, Clean Catch  Specimen Source->Urine   COMPREHENSIVE METABOLIC PANEL   URINALYSIS, REFLEX TO URINE CULTURE    Narrative:     Preferred Collection Type->Urine, Clean Catch  Specimen Source->Urine          Imaging Results    None          Medications   sodium chloride 0.9% bolus 1,000 mL 1,000 mL (0 mLs Intravenous Stopped 6/7/24 2343)   ketorolac injection 15 mg (15 mg Intravenous Given 6/8/24 0027)     Medical Decision Making  Nontoxic appearing. No SIRS. Etiology likely substance abuse disorder versus viral illness.     Problems Addressed:  Amphetamine abuse: chronic illness or injury  Malaise and fatigue: acute illness or injury    Amount and/or Complexity of Data Reviewed  Labs: ordered. Decision-making details documented in ED Course.    Risk  Prescription drug management.                                      Clinical Impression:  Final diagnoses:  [R53.81, R53.83] Malaise and fatigue (Primary)  [F15.10] Amphetamine abuse          ED Disposition Condition    Discharge Stable          ED Prescriptions    None       Follow-up Information       Follow up With Specialties Details Why Contact Info    Sara Osullivan, CHRIS Family  Medicine Schedule an appointment as soon as possible for a visit   [address] [place] [id number]  [phone]               Nikhil García MD  06/17/24 0612

## 2024-08-09 ENCOUNTER — HOSPITAL ENCOUNTER (EMERGENCY)
Facility: HOSPITAL | Age: 39
Discharge: HOME | End: 2024-08-09
Attending: STUDENT IN AN ORGANIZED HEALTH CARE EDUCATION/TRAINING PROGRAM
Payer: MEDICAID

## 2024-08-09 VITALS
HEART RATE: 79 BPM | RESPIRATION RATE: 20 BRPM | BODY MASS INDEX: 24.25 KG/M2 | SYSTOLIC BLOOD PRESSURE: 136 MMHG | DIASTOLIC BLOOD PRESSURE: 61 MMHG | HEIGHT: 68 IN | TEMPERATURE: 98 F | WEIGHT: 160 LBS | OXYGEN SATURATION: 98 %

## 2024-08-09 DIAGNOSIS — G93.89 CEREBRAL VENTRICULOMEGALY: ICD-10-CM

## 2024-08-09 DIAGNOSIS — G51.0 BELL'S PALSY: Primary | ICD-10-CM

## 2024-08-09 PROCEDURE — 63600175 PHARM REV CODE 636 W HCPCS: Performed by: STUDENT IN AN ORGANIZED HEALTH CARE EDUCATION/TRAINING PROGRAM

## 2024-08-09 PROCEDURE — 99284 EMERGENCY DEPT VISIT MOD MDM: CPT | Mod: 25

## 2024-08-09 RX ORDER — PREDNISONE 20 MG/1
60 TABLET ORAL DAILY
Qty: 10 TABLET | Refills: 0 | Status: SHIPPED | OUTPATIENT
Start: 2024-08-10 | End: 2024-08-17

## 2024-08-09 RX ORDER — ACYCLOVIR 400 MG/1
400 TABLET ORAL
Qty: 35 TABLET | Refills: 0 | Status: SHIPPED | OUTPATIENT
Start: 2024-08-09 | End: 2024-08-16

## 2024-08-09 RX ORDER — PREDNISONE 20 MG/1
60 TABLET ORAL
Status: COMPLETED | OUTPATIENT
Start: 2024-08-09 | End: 2024-08-09

## 2024-08-09 RX ADMIN — PREDNISONE 60 MG: 20 TABLET ORAL at 08:08

## 2024-08-09 NOTE — ED PROVIDER NOTES
"  History  Chief Complaint   Patient presents with    Facial Droop     Pt to the ER w/ complaints of L sided facial droop starting at 1630, blurry vision beginning at 1100. No other deficits noted during triage. Also reports pain to L side of neck. No hx of stroke.      38-year-old male presents for evaluation of left-sided facial droop associated with blurred vision.  Blurred vision began around 8:00 a.m..  Left-sided facial droop began around 3:00 p.m..  Patient with no history of similar symptoms in the past.  No medical problems reported.  All other systemic symptoms reviewed and unremarkable.        History reviewed. No pertinent past medical history.    History reviewed. No pertinent surgical history.    No family history on file.    Social History     Tobacco Use    Smoking status: Every Day     Current packs/day: 1.00     Types: Cigarettes    Smokeless tobacco: Never   Substance Use Topics    Alcohol use: Never    Drug use: Yes     Types: Methamphetamines       ROS  Review of Systems   Neurological:         Left-sided facial droop       Physical Exam  /61   Pulse 79   Temp 98 °F (36.7 °C)   Resp 20   Ht 5' 8" (1.727 m)   Wt 72.6 kg (160 lb)   SpO2 98%   BMI 24.33 kg/m²   Physical Exam    Constitutional: He appears well-developed and well-nourished. He is cooperative.   HENT:   Head: Normocephalic and atraumatic.   Eyes: Conjunctivae, EOM and lids are normal. Pupils are equal, round, and reactive to light.   Neck: Phonation normal.   Normal range of motion.  Cardiovascular:  Normal rate, regular rhythm and intact distal pulses.           Pulmonary/Chest: Breath sounds normal.   Abdominal: Abdomen is soft. He exhibits no distension.   Musculoskeletal:      Cervical back: Normal range of motion.     Neurological: He is alert and oriented to person, place, and time. He has normal strength. A cranial nerve deficit is present. No sensory deficit. Gait abnormal. Coordination normal. GCS eye subscore is " 4. GCS verbal subscore is 5. GCS motor subscore is 6.   -cranial nerve 7 deficit throughout the left side of face   Skin: Skin is warm and dry.   Psychiatric: He has a normal mood and affect. His speech is normal and behavior is normal.               Labs Reviewed - No data to display        Imaging Results              CT Head Without Contrast (In process)                                 Procedures             Medical Decision Making  38-year-old male presents for evaluation of a left sided facial droop associated with blurred vision.  Full neurologic assessment other than this completely unremarkable.  Differentials include CVA versus Bell's palsy.  Low suspicion for acute CV as NIH score is otherwise low.  Do strongly suspect symptoms related to Bell's palsy.  Patient completely asymptomatic otherwise.  Will obtain CT head    Amount and/or Complexity of Data Reviewed  Radiology: ordered. Decision-making details documented in ED Course.    Risk  Prescription drug management.          Additional MDM:     NIH Stroke Scale:   Interval = baseline (upon arrival/admit)  Level of consciousness = 0 - alert  LOC questions = 0 - answers both correctly  LOC commands = 0 - performs both correctly  Best gaze = 0 - normal  Visual = 0 - no visual loss  Facial palsy = 3 - complete  Motor left arm =  0 - no drift  Motor right arm =  0 - no drift  Motor left leg = 0 - no drift  Motor right leg =  0 - no drift  Limb ataxia = 0 - absent  Sensory = 0 - normal  Best language = 0 - no aphasia  Dysarthria = 0 - normal articulation  Extinction and inattention = 0 - no neglect  NIH Stroke Scale Total = 3          ED Course as of 08/09/24 2040   Fri Aug 09, 2024   2026 CT Head Without Contrast  No acute intracranial abnormality.  Slight prominence of the ventricular system, possible chronic communicating hydrocephalus can not be excluded [NA]   2038 Will discharge with prescription for antiviral and steroids.  Advised patient on neurology  and neurosurgery evaluation related to slight prominence of the ventricular system.  Return precautions were given [NA]      ED Course User Index  [NA] Wendy Donald MD       DISCHARGE NOTE:       Kj Denney's  results have been reviewed with him.  He has been counseled regarding his diagnosis, treatment, and plan.  He verbally conveys understanding and agreement of the signs, symptoms, diagnosis, treatment and prognosis and additionally agrees to follow up as discussed.  He also agrees with the care-plan and conveys that all of his questions have been answered.  I have also provided discharge instructions for him that include: educational information regarding their diagnosis and treatment, and list of reasons why they would want to return to the ED prior to their follow-up appointment, should his condition change. He has been provided with education for proper emergency department utilization.      CLINICAL IMPRESSION:         1. Bell's palsy    2. Cerebral ventriculomegaly              PLAN:   1. Discharge  2.   New Prescriptions    ACYCLOVIR (ZOVIRAX) 400 MG TABLET    Take 1 tablet (400 mg total) by mouth 5 (five) times daily. for 7 days    PREDNISONE (DELTASONE) 20 MG TABLET    Take 3 tablets (60 mg total) by mouth once daily. for 7 days     3. No follow-up provider specified.        Wendy Donald MD  08/09/24 2040

## 2024-08-10 NOTE — DISCHARGE INSTRUCTIONS
Artificial tears qhr while patient is awake  Ophthalmic ointment at night  Eye should be taped shut at night  Protective glasses or goggles  Take prescribed steroids and acyclovir  Follow up with Neurology and surgery.  Return if you have any new or worsening neurologic deficits such as slurred speech, gait abnormality, vision loss or alternate pathology

## 2025-05-22 NOTE — H&P
[facility]  History & Physical    Patient Name: Kj Denney  MRN: [id number]  Patient Class: IP- Inpatient  Admission Date: 11/10/2022  Attending Physician: Jeanne Alvares MD   Primary Care Provider: CHRIS Beck         Patient information was obtained from patient and ER records.     Subjective:     Principal Problem:Sepsis    Chief Complaint:   Chief Complaint   Patient presents with    Influenza     Pt diagnosed with flu on 11/06/22 - presents to ED with worsening complaints weakness vomiting/diarrhea with productive cough. Room air SPO2 87%        HPI: 37-year-old male with history of smoking recently diagnosed with influenza on November 6 presents with worsening of cough and shortness of breath.  Patient was discharged on Tamiflu but was not able to fill due to feeling weak and diffuse pain.  Patient says that his cough and shortness breath has progressively gotten worse.  Patient denies any history of blood clots, leg swelling, vomiting, chest pain  Pt this am denies productive cough      History reviewed. No pertinent past medical history.    History reviewed. No pertinent surgical history.    Review of patient's allergies indicates:  No Known Allergies    No current facility-administered medications on file prior to encounter.     Current Outpatient Medications on File Prior to Encounter   Medication Sig    albuterol (PROVENTIL HFA) 90 mcg/actuation inhaler Inhale 2 puffs into the lungs every 6 (six) hours as needed for Wheezing. Rescue    azithromycin (Z-LA) 250 MG tablet Take 1 tablet (250 mg total) by mouth once daily. Take first 2 tablets together, then 1 every day until finished.    fluticasone propionate (FLONASE) 50 mcg/actuation nasal spray 1 spray (50 mcg total) by Each Nostril route 2 (two) times daily as needed.    ibuprofen (ADVIL,MOTRIN) 600 MG tablet Take 1 tablet (600 mg total) by mouth every 6 (six) hours as needed for Pain.    ondansetron (ZOFRAN)  4 MG tablet Take 1 tablet (4 mg total) by mouth every 6 (six) hours.    oseltamivir (TAMIFLU) 75 MG capsule Take 1 capsule (75 mg total) by mouth 2 (two) times daily. for 5 days    promethazine-dextromethorphan (PROMETHAZINE-DM) 6.25-15 mg/5 mL Syrp Take 5 mLs by mouth every 4 (four) hours as needed.    traMADoL (ULTRAM) 50 mg tablet Take 1 tablet (50 mg total) by mouth every 6 (six) hours as needed for Pain.     Family History    None       Tobacco Use    Smoking status: Smoker, Current Status Unknown     Packs/day: 1.00     Types: Cigarettes    Smokeless tobacco: Never   Substance and Sexual Activity    Alcohol use: Not Currently    Drug use: Not Currently    Sexual activity: Not on file     Review of Systems   Constitutional:  Positive for activity change, chills, fatigue and fever.   HENT:  Positive for congestion and postnasal drip. Negative for sore throat.    Respiratory:  Positive for cough, shortness of breath and wheezing.    Cardiovascular:  Negative for chest pain, palpitations and leg swelling.   Gastrointestinal:  Negative for abdominal pain, diarrhea, nausea and vomiting.   Genitourinary:  Negative for difficulty urinating.   Musculoskeletal:  Positive for arthralgias and myalgias. Negative for back pain.   Skin:  Negative for rash and wound.   Neurological:  Positive for weakness. Negative for seizures, syncope and speech difficulty.   Psychiatric/Behavioral:  Negative for agitation and behavioral problems.    Objective:     Vital Signs (Most Recent):  Temp: 98.7 °F (37.1 °C) (11/11/22 1149)  Pulse: 92 (11/11/22 1448)  Resp: 20 (11/11/22 1240)  BP: (!) 106/55 (11/11/22 1149)  SpO2: (S) 100 % (11/11/22 1249)   Vital Signs (24h Range):  Temp:  [97.8 °F (36.6 °C)-100.4 °F (38 °C)] 98.7 °F (37.1 °C)  Pulse:  [76-99] 92  Resp:  [18-20] 20  SpO2:  [90 %-100 %] 100 %  BP: ()/(52-64) 106/55     Weight: 68.9 kg (152 lb)  Body mass index is 21.81 kg/m².    Physical Exam  Constitutional:        Appearance: He is ill-appearing.   HENT:      Nose: Nose normal.      Mouth/Throat:      Mouth: Mucous membranes are moist.   Eyes:      Extraocular Movements: Extraocular movements intact.      Pupils: Pupils are equal, round, and reactive to light.   Cardiovascular:      Rate and Rhythm: Normal rate and regular rhythm.   Pulmonary:      Effort: Pulmonary effort is normal.      Breath sounds: Rhonchi present.      Comments: O2 via NC  Abdominal:      General: Bowel sounds are normal. There is no distension.      Palpations: Abdomen is soft.      Tenderness: There is no abdominal tenderness.   Musculoskeletal:         General: Normal range of motion.   Skin:     General: Skin is warm and dry.      Capillary Refill: Capillary refill takes less than 2 seconds.   Neurological:      General: No focal deficit present.      Mental Status: He is alert and oriented to person, place, and time.         CRANIAL NERVES     CN III, IV, VI   Pupils are equal, round, and reactive to light.     Significant Labs: All pertinent labs within the past 24 hours have been reviewed.  Recent Lab Results         11/11/22  0557        Albumin 2.1       Alkaline Phosphatase 71       ALT 29       Anion Gap 4       AST 42       Bands 11.0       Baso # CANCELED  Comment: Result canceled by the ancillary.       Basophil % 0.0       BILIRUBIN TOTAL 0.5  Comment: For infants and newborns, interpretation of results should be based  on gestational age, weight and in agreement with clinical  observations.    Premature Infant recommended reference ranges:  Up to 24 hours.............<8.0 mg/dL  Up to 48 hours............<12.0 mg/dL  3-5 days..................<15.0 mg/dL  6-29 days.................<15.0 mg/dL    For patients on Eltrombopag therapy, use of Dimension [place] TBIL is   not   recommended.         BUN 13       Calcium 7.6       Chloride 103       CO2 30       Creatinine 0.7       Differential Method Manual  Comment: CORRECTED RESULT; previously  reported as Automated on 11/11/2022 at   06:38.    [C]       eGFR >60.0       Eos # CANCELED  Comment: Result canceled by the ancillary.       Eosinophil % 0.0       Glucose 150       Gran % 64.0       Hematocrit 33.1       Hemoglobin 11.8       Immature Grans (Abs) CANCELED  Comment: Mild elevation in immature granulocytes is non specific and   can be seen in a variety of conditions including stress response,   acute inflammation, trauma and pregnancy. Correlation with other   laboratory and clinical findings is essential.    Result canceled by the ancillary.         Immature Granulocytes CANCELED  Comment: Result canceled by the ancillary.       Lymph # CANCELED  Comment: Result canceled by the ancillary.       Lymph % 23.0       Magnesium 2.1       MCH 29.9       MCHC 35.6       MCV 84       Mono # CANCELED  Comment: Result canceled by the ancillary.       Mono % 2.0       MPV 9.6       nRBC 0       Platelets 135       Potassium 3.6       PROTEIN TOTAL 5.8       RBC 3.94       RDW 12.2       Sodium 137       WBC 3.92                [C] - Corrected Result               Significant Imaging: I have reviewed all pertinent imaging results/findings within the past 24 hours.    Assessment/Plan:     * Sepsis  This patient does have evidence of infective focus  My overall impression is sepsis. Vital signs were reviewed and noted in progress note.  Antibiotics given-   Antibiotics (From admission, onward)    Start     Stop Route Frequency Ordered    11/11/22 0330  vancomycin in dextrose 5 % 1 gram/250 mL IVPB 1,000 mg         -- IV Every 12 hours (non-standard times) 11/10/22 1514    11/11/22 0000  cefTRIAXone (ROCEPHIN) 1 g/50 mL D5W IVPB         -- IV Every 24 hours (non-standard times) 11/10/22 1429    11/10/22 1458  vancomycin - pharmacy to dose  (vancomycin IVPB)        See Leyla for full Linked Orders Report.    -- IV pharmacy to manage frequency 11/10/22 1429        Cultures were taken-   Microbiology Results  (last 7 days)     Procedure Component Value Units Date/Time    Blood culture x two cultures. Draw prior to antibiotics. [[id number]] Collected: 11/10/22 1231    Order Status: Completed Specimen: Blood Updated: 11/11/22 1315     Blood Culture, Routine No Growth to date    Narrative:      Aerobic and anaerobic    Blood culture x two cultures. Draw prior to antibiotics. [[id number]] Collected: 11/10/22 1228    Order Status: Completed Specimen: Blood Updated: 11/11/22 1315     Blood Culture, Routine No Growth to date    Narrative:      Aerobic and anaerobic        Latest lactate reviewed, they are-  Recent Labs   Lab 11/10/22  1230 11/10/22  1547   LACTATE 2.5* 2.4*       Organ dysfunction indicated by Acute respiratory failure  Source- flu A, pneumo    Source control Achieved by- ivfs, iv abxs, isolation, o2 and nebs      Hypoxic  o2 protocal      Pneumonia due to infectious organism  tx for post pneumo influenza pneumo - rocephin and vanc, mucinex, o2 and nebs - repeat cxr in am      Influenza A  Isolation and symptomatic tx - over 5 days since first positive test      Respiratory insufficiency  o2 protocal        VTE Risk Mitigation (From admission, onward)         Ordered     enoxaparin injection 40 mg  Daily         11/10/22 1429     IP VTE LOW RISK PATIENT  Once         11/10/22 1429     Place DARCI hose  Until discontinued         11/10/22 1429     Place DARCI hose  Until discontinued         11/10/22 1429                   Jeanne Alvares MD  Department of Hospital Medicine   [facility]   Pt received semisupine in bed with +IVL. NAD noted. Patient encountered OOB in chair, NAD, +tele, +pulse ox, +bailey and +PIV